# Patient Record
Sex: MALE | Race: WHITE | Employment: FULL TIME | URBAN - METROPOLITAN AREA
[De-identification: names, ages, dates, MRNs, and addresses within clinical notes are randomized per-mention and may not be internally consistent; named-entity substitution may affect disease eponyms.]

---

## 2022-05-19 ENCOUNTER — HOSPITAL ENCOUNTER (INPATIENT)
Age: 32
LOS: 2 days | Discharge: HOME | DRG: 854 | End: 2022-05-21
Attending: EMERGENCY MEDICINE | Admitting: INTERNAL MEDICINE
Payer: COMMERCIAL

## 2022-05-19 ENCOUNTER — APPOINTMENT (OUTPATIENT)
Dept: CT IMAGING | Age: 32
DRG: 854 | End: 2022-05-19
Payer: COMMERCIAL

## 2022-05-19 ENCOUNTER — ANESTHESIA (OUTPATIENT)
Dept: OPERATING ROOM | Age: 32
DRG: 854 | End: 2022-05-19
Payer: COMMERCIAL

## 2022-05-19 ENCOUNTER — ANESTHESIA EVENT (OUTPATIENT)
Dept: OPERATING ROOM | Age: 32
DRG: 854 | End: 2022-05-19
Payer: COMMERCIAL

## 2022-05-19 DIAGNOSIS — A41.9 SEPTICEMIA (HCC): ICD-10-CM

## 2022-05-19 DIAGNOSIS — N49.2 SCROTAL ABSCESS: Primary | ICD-10-CM

## 2022-05-19 LAB
A/G RATIO: 1.2 (ref 1.1–2.2)
ALBUMIN SERPL-MCNC: 4.5 G/DL (ref 3.4–5)
ALP BLD-CCNC: 108 U/L (ref 40–129)
ALT SERPL-CCNC: 16 U/L (ref 10–40)
ANION GAP SERPL CALCULATED.3IONS-SCNC: 14 MMOL/L (ref 3–16)
AST SERPL-CCNC: 17 U/L (ref 15–37)
BASOPHILS ABSOLUTE: 0 K/UL (ref 0–0.2)
BASOPHILS RELATIVE PERCENT: 0 %
BILIRUB SERPL-MCNC: 0.8 MG/DL (ref 0–1)
BILIRUBIN URINE: NEGATIVE
BLOOD, URINE: NEGATIVE
BUN BLDV-MCNC: 8 MG/DL (ref 7–20)
CALCIUM SERPL-MCNC: 9.2 MG/DL (ref 8.3–10.6)
CHLORIDE BLD-SCNC: 98 MMOL/L (ref 99–110)
CLARITY: CLEAR
CO2: 22 MMOL/L (ref 21–32)
COLOR: YELLOW
CREAT SERPL-MCNC: 1 MG/DL (ref 0.9–1.3)
EOSINOPHILS ABSOLUTE: 0 K/UL (ref 0–0.6)
EOSINOPHILS RELATIVE PERCENT: 0 %
GFR AFRICAN AMERICAN: >60
GFR NON-AFRICAN AMERICAN: >60
GLUCOSE BLD-MCNC: 97 MG/DL (ref 70–99)
GLUCOSE URINE: NEGATIVE MG/DL
HCT VFR BLD CALC: 42.1 % (ref 40.5–52.5)
HEMOGLOBIN: 14.5 G/DL (ref 13.5–17.5)
KETONES, URINE: NEGATIVE MG/DL
LACTIC ACID: 1 MMOL/L (ref 0.4–2)
LEUKOCYTE ESTERASE, URINE: NEGATIVE
LYMPHOCYTES ABSOLUTE: 2.1 K/UL (ref 1–5.1)
LYMPHOCYTES RELATIVE PERCENT: 12 %
MCH RBC QN AUTO: 29.7 PG (ref 26–34)
MCHC RBC AUTO-ENTMCNC: 34.4 G/DL (ref 31–36)
MCV RBC AUTO: 86.3 FL (ref 80–100)
MICROSCOPIC EXAMINATION: NORMAL
MONOCYTES ABSOLUTE: 1.1 K/UL (ref 0–1.3)
MONOCYTES RELATIVE PERCENT: 6 %
NEUTROPHILS ABSOLUTE: 14.4 K/UL (ref 1.7–7.7)
NEUTROPHILS RELATIVE PERCENT: 82 %
NITRITE, URINE: NEGATIVE
PDW BLD-RTO: 13.2 % (ref 12.4–15.4)
PH UA: 7 (ref 5–8)
PLATELET # BLD: 225 K/UL (ref 135–450)
PLATELET SLIDE REVIEW: ADEQUATE
PMV BLD AUTO: 7.8 FL (ref 5–10.5)
POTASSIUM SERPL-SCNC: 4.1 MMOL/L (ref 3.5–5.1)
PROTEIN UA: NEGATIVE MG/DL
RBC # BLD: 4.88 M/UL (ref 4.2–5.9)
RBC # BLD: NORMAL 10*6/UL
SLIDE REVIEW: ABNORMAL
SODIUM BLD-SCNC: 134 MMOL/L (ref 136–145)
SPECIFIC GRAVITY UA: 1.01 (ref 1–1.03)
TOTAL PROTEIN: 8.2 G/DL (ref 6.4–8.2)
URINE REFLEX TO CULTURE: NORMAL
URINE TYPE: NORMAL
UROBILINOGEN, URINE: 0.2 E.U./DL
WBC # BLD: 17.6 K/UL (ref 4–11)

## 2022-05-19 PROCEDURE — 2709999900 HC NON-CHARGEABLE SUPPLY: Performed by: UROLOGY

## 2022-05-19 PROCEDURE — 87116 MYCOBACTERIA CULTURE: CPT

## 2022-05-19 PROCEDURE — A4217 STERILE WATER/SALINE, 500 ML: HCPCS | Performed by: UROLOGY

## 2022-05-19 PROCEDURE — 6360000002 HC RX W HCPCS: Performed by: ANESTHESIOLOGY

## 2022-05-19 PROCEDURE — 81003 URINALYSIS AUTO W/O SCOPE: CPT

## 2022-05-19 PROCEDURE — 3700000000 HC ANESTHESIA ATTENDED CARE: Performed by: UROLOGY

## 2022-05-19 PROCEDURE — 1200000000 HC SEMI PRIVATE

## 2022-05-19 PROCEDURE — 3700000001 HC ADD 15 MINUTES (ANESTHESIA): Performed by: UROLOGY

## 2022-05-19 PROCEDURE — 96375 TX/PRO/DX INJ NEW DRUG ADDON: CPT

## 2022-05-19 PROCEDURE — 36415 COLL VENOUS BLD VENIPUNCTURE: CPT

## 2022-05-19 PROCEDURE — 6370000000 HC RX 637 (ALT 250 FOR IP): Performed by: PHYSICIAN ASSISTANT

## 2022-05-19 PROCEDURE — 2500000003 HC RX 250 WO HCPCS: Performed by: PHYSICIAN ASSISTANT

## 2022-05-19 PROCEDURE — 3600000012 HC SURGERY LEVEL 2 ADDTL 15MIN: Performed by: UROLOGY

## 2022-05-19 PROCEDURE — 80053 COMPREHEN METABOLIC PANEL: CPT

## 2022-05-19 PROCEDURE — 6370000000 HC RX 637 (ALT 250 FOR IP): Performed by: UROLOGY

## 2022-05-19 PROCEDURE — 2580000003 HC RX 258: Performed by: UROLOGY

## 2022-05-19 PROCEDURE — 99285 EMERGENCY DEPT VISIT HI MDM: CPT

## 2022-05-19 PROCEDURE — 6360000002 HC RX W HCPCS: Performed by: PHYSICIAN ASSISTANT

## 2022-05-19 PROCEDURE — 6360000002 HC RX W HCPCS: Performed by: UROLOGY

## 2022-05-19 PROCEDURE — 0V950ZZ DRAINAGE OF SCROTUM, OPEN APPROACH: ICD-10-PCS | Performed by: UROLOGY

## 2022-05-19 PROCEDURE — 87075 CULTR BACTERIA EXCEPT BLOOD: CPT

## 2022-05-19 PROCEDURE — 87102 FUNGUS ISOLATION CULTURE: CPT

## 2022-05-19 PROCEDURE — 83605 ASSAY OF LACTIC ACID: CPT

## 2022-05-19 PROCEDURE — 6360000002 HC RX W HCPCS: Performed by: INTERNAL MEDICINE

## 2022-05-19 PROCEDURE — 6360000004 HC RX CONTRAST MEDICATION: Performed by: PHYSICIAN ASSISTANT

## 2022-05-19 PROCEDURE — 3600000002 HC SURGERY LEVEL 2 BASE: Performed by: UROLOGY

## 2022-05-19 PROCEDURE — 87040 BLOOD CULTURE FOR BACTERIA: CPT

## 2022-05-19 PROCEDURE — 7100000000 HC PACU RECOVERY - FIRST 15 MIN: Performed by: UROLOGY

## 2022-05-19 PROCEDURE — 72193 CT PELVIS W/DYE: CPT

## 2022-05-19 PROCEDURE — 96365 THER/PROPH/DIAG IV INF INIT: CPT

## 2022-05-19 PROCEDURE — 87070 CULTURE OTHR SPECIMN AEROBIC: CPT

## 2022-05-19 PROCEDURE — 7100000001 HC PACU RECOVERY - ADDTL 15 MIN: Performed by: UROLOGY

## 2022-05-19 PROCEDURE — 2580000003 HC RX 258: Performed by: ANESTHESIOLOGY

## 2022-05-19 PROCEDURE — 87015 SPECIMEN INFECT AGNT CONCNTJ: CPT

## 2022-05-19 PROCEDURE — 2580000003 HC RX 258: Performed by: PHYSICIAN ASSISTANT

## 2022-05-19 PROCEDURE — 87206 SMEAR FLUORESCENT/ACID STAI: CPT

## 2022-05-19 PROCEDURE — 85025 COMPLETE CBC W/AUTO DIFF WBC: CPT

## 2022-05-19 PROCEDURE — 87205 SMEAR GRAM STAIN: CPT

## 2022-05-19 RX ORDER — ACETAMINOPHEN 500 MG
1000 TABLET ORAL ONCE
Status: COMPLETED | OUTPATIENT
Start: 2022-05-19 | End: 2022-05-19

## 2022-05-19 RX ORDER — 0.9 % SODIUM CHLORIDE 0.9 %
1000 INTRAVENOUS SOLUTION INTRAVENOUS ONCE
Status: COMPLETED | OUTPATIENT
Start: 2022-05-19 | End: 2022-05-19

## 2022-05-19 RX ORDER — SULFAMETHOXAZOLE AND TRIMETHOPRIM 800; 160 MG/1; MG/1
1 TABLET ORAL 2 TIMES DAILY
Status: ON HOLD | COMMUNITY
Start: 2022-05-11 | End: 2022-05-21 | Stop reason: HOSPADM

## 2022-05-19 RX ORDER — OXYCODONE HYDROCHLORIDE 5 MG/1
5 TABLET ORAL EVERY 4 HOURS PRN
Status: DISCONTINUED | OUTPATIENT
Start: 2022-05-19 | End: 2022-05-21 | Stop reason: HOSPADM

## 2022-05-19 RX ORDER — ACETAMINOPHEN 325 MG/1
650 TABLET ORAL EVERY 6 HOURS PRN
Status: DISCONTINUED | OUTPATIENT
Start: 2022-05-19 | End: 2022-05-21 | Stop reason: HOSPADM

## 2022-05-19 RX ORDER — MEPERIDINE HYDROCHLORIDE 25 MG/ML
12.5 INJECTION INTRAMUSCULAR; INTRAVENOUS; SUBCUTANEOUS
Status: DISCONTINUED | OUTPATIENT
Start: 2022-05-19 | End: 2022-05-19 | Stop reason: HOSPADM

## 2022-05-19 RX ORDER — ONDANSETRON 2 MG/ML
4 INJECTION INTRAMUSCULAR; INTRAVENOUS EVERY 6 HOURS PRN
Status: DISCONTINUED | OUTPATIENT
Start: 2022-05-19 | End: 2022-05-21 | Stop reason: HOSPADM

## 2022-05-19 RX ORDER — ONDANSETRON 2 MG/ML
INJECTION INTRAMUSCULAR; INTRAVENOUS PRN
Status: DISCONTINUED | OUTPATIENT
Start: 2022-05-19 | End: 2022-05-19 | Stop reason: SDUPTHER

## 2022-05-19 RX ORDER — DEXAMETHASONE SODIUM PHOSPHATE 4 MG/ML
INJECTION, SOLUTION INTRA-ARTICULAR; INTRALESIONAL; INTRAMUSCULAR; INTRAVENOUS; SOFT TISSUE PRN
Status: DISCONTINUED | OUTPATIENT
Start: 2022-05-19 | End: 2022-05-19 | Stop reason: SDUPTHER

## 2022-05-19 RX ORDER — SODIUM CHLORIDE 9 MG/ML
INJECTION, SOLUTION INTRAVENOUS CONTINUOUS PRN
Status: DISCONTINUED | OUTPATIENT
Start: 2022-05-19 | End: 2022-05-19 | Stop reason: SDUPTHER

## 2022-05-19 RX ORDER — FENTANYL CITRATE 50 UG/ML
INJECTION, SOLUTION INTRAMUSCULAR; INTRAVENOUS PRN
Status: DISCONTINUED | OUTPATIENT
Start: 2022-05-19 | End: 2022-05-19 | Stop reason: SDUPTHER

## 2022-05-19 RX ORDER — CLINDAMYCIN PHOSPHATE 600 MG/50ML
600 INJECTION INTRAVENOUS ONCE
Status: COMPLETED | OUTPATIENT
Start: 2022-05-19 | End: 2022-05-19

## 2022-05-19 RX ORDER — ACETAMINOPHEN 650 MG/1
650 SUPPOSITORY RECTAL EVERY 6 HOURS PRN
Status: DISCONTINUED | OUTPATIENT
Start: 2022-05-19 | End: 2022-05-21 | Stop reason: HOSPADM

## 2022-05-19 RX ORDER — ONDANSETRON 2 MG/ML
4 INJECTION INTRAMUSCULAR; INTRAVENOUS ONCE
Status: COMPLETED | OUTPATIENT
Start: 2022-05-19 | End: 2022-05-19

## 2022-05-19 RX ORDER — MORPHINE SULFATE 4 MG/ML
4 INJECTION, SOLUTION INTRAMUSCULAR; INTRAVENOUS ONCE
Status: COMPLETED | OUTPATIENT
Start: 2022-05-19 | End: 2022-05-19

## 2022-05-19 RX ORDER — SODIUM CHLORIDE 0.9 % (FLUSH) 0.9 %
5-40 SYRINGE (ML) INJECTION PRN
Status: DISCONTINUED | OUTPATIENT
Start: 2022-05-19 | End: 2022-05-21 | Stop reason: HOSPADM

## 2022-05-19 RX ORDER — SODIUM CHLORIDE 0.9 % (FLUSH) 0.9 %
5-40 SYRINGE (ML) INJECTION EVERY 12 HOURS SCHEDULED
Status: DISCONTINUED | OUTPATIENT
Start: 2022-05-19 | End: 2022-05-19 | Stop reason: HOSPADM

## 2022-05-19 RX ORDER — FENTANYL CITRATE 50 UG/ML
50 INJECTION, SOLUTION INTRAMUSCULAR; INTRAVENOUS EVERY 5 MIN PRN
Status: DISCONTINUED | OUTPATIENT
Start: 2022-05-19 | End: 2022-05-19 | Stop reason: HOSPADM

## 2022-05-19 RX ORDER — ENOXAPARIN SODIUM 100 MG/ML
30 INJECTION SUBCUTANEOUS 2 TIMES DAILY
Status: DISCONTINUED | OUTPATIENT
Start: 2022-05-19 | End: 2022-05-21 | Stop reason: HOSPADM

## 2022-05-19 RX ORDER — MIDAZOLAM HYDROCHLORIDE 1 MG/ML
INJECTION INTRAMUSCULAR; INTRAVENOUS PRN
Status: DISCONTINUED | OUTPATIENT
Start: 2022-05-19 | End: 2022-05-19 | Stop reason: SDUPTHER

## 2022-05-19 RX ORDER — SODIUM CHLORIDE 9 MG/ML
INJECTION, SOLUTION INTRAVENOUS PRN
Status: DISCONTINUED | OUTPATIENT
Start: 2022-05-19 | End: 2022-05-21 | Stop reason: HOSPADM

## 2022-05-19 RX ORDER — POLYETHYLENE GLYCOL 3350 17 G/17G
17 POWDER, FOR SOLUTION ORAL DAILY PRN
Status: DISCONTINUED | OUTPATIENT
Start: 2022-05-19 | End: 2022-05-21 | Stop reason: HOSPADM

## 2022-05-19 RX ORDER — IBUPROFEN 200 MG
400 TABLET ORAL EVERY 6 HOURS PRN
COMMUNITY

## 2022-05-19 RX ORDER — SODIUM CHLORIDE 0.9 % (FLUSH) 0.9 %
5-40 SYRINGE (ML) INJECTION PRN
Status: DISCONTINUED | OUTPATIENT
Start: 2022-05-19 | End: 2022-05-19 | Stop reason: HOSPADM

## 2022-05-19 RX ORDER — PROPOFOL 10 MG/ML
INJECTION, EMULSION INTRAVENOUS PRN
Status: DISCONTINUED | OUTPATIENT
Start: 2022-05-19 | End: 2022-05-19 | Stop reason: SDUPTHER

## 2022-05-19 RX ORDER — SODIUM CHLORIDE 9 MG/ML
INJECTION, SOLUTION INTRAVENOUS PRN
Status: DISCONTINUED | OUTPATIENT
Start: 2022-05-19 | End: 2022-05-19 | Stop reason: HOSPADM

## 2022-05-19 RX ORDER — FENTANYL CITRATE 50 UG/ML
25 INJECTION, SOLUTION INTRAMUSCULAR; INTRAVENOUS EVERY 5 MIN PRN
Status: DISCONTINUED | OUTPATIENT
Start: 2022-05-19 | End: 2022-05-19 | Stop reason: HOSPADM

## 2022-05-19 RX ORDER — CLINDAMYCIN PHOSPHATE 600 MG/50ML
600 INJECTION INTRAVENOUS EVERY 8 HOURS
Status: DISCONTINUED | OUTPATIENT
Start: 2022-05-19 | End: 2022-05-19

## 2022-05-19 RX ORDER — MORPHINE SULFATE 2 MG/ML
2 INJECTION, SOLUTION INTRAMUSCULAR; INTRAVENOUS EVERY 4 HOURS PRN
Status: DISCONTINUED | OUTPATIENT
Start: 2022-05-19 | End: 2022-05-21 | Stop reason: HOSPADM

## 2022-05-19 RX ORDER — ONDANSETRON 2 MG/ML
4 INJECTION INTRAMUSCULAR; INTRAVENOUS
Status: DISCONTINUED | OUTPATIENT
Start: 2022-05-19 | End: 2022-05-19 | Stop reason: HOSPADM

## 2022-05-19 RX ORDER — ONDANSETRON 4 MG/1
4 TABLET, ORALLY DISINTEGRATING ORAL EVERY 8 HOURS PRN
Status: DISCONTINUED | OUTPATIENT
Start: 2022-05-19 | End: 2022-05-21 | Stop reason: HOSPADM

## 2022-05-19 RX ORDER — SODIUM CHLORIDE 0.9 % (FLUSH) 0.9 %
5-40 SYRINGE (ML) INJECTION EVERY 12 HOURS SCHEDULED
Status: DISCONTINUED | OUTPATIENT
Start: 2022-05-19 | End: 2022-05-21 | Stop reason: HOSPADM

## 2022-05-19 RX ORDER — MAGNESIUM HYDROXIDE 1200 MG/15ML
LIQUID ORAL CONTINUOUS PRN
Status: COMPLETED | OUTPATIENT
Start: 2022-05-19 | End: 2022-05-19

## 2022-05-19 RX ADMIN — PROPOFOL 160 MG: 10 INJECTION, EMULSION INTRAVENOUS at 17:32

## 2022-05-19 RX ADMIN — MORPHINE SULFATE 4 MG: 4 INJECTION, SOLUTION INTRAMUSCULAR; INTRAVENOUS at 12:30

## 2022-05-19 RX ADMIN — SODIUM CHLORIDE 1000 ML: 9 INJECTION, SOLUTION INTRAVENOUS at 15:18

## 2022-05-19 RX ADMIN — FENTANYL CITRATE 50 MCG: 50 INJECTION INTRAMUSCULAR; INTRAVENOUS at 18:12

## 2022-05-19 RX ADMIN — ENOXAPARIN SODIUM 30 MG: 100 INJECTION SUBCUTANEOUS at 21:42

## 2022-05-19 RX ADMIN — DEXAMETHASONE SODIUM PHOSPHATE 8 MG: 4 INJECTION, SOLUTION INTRAMUSCULAR; INTRAVENOUS at 17:37

## 2022-05-19 RX ADMIN — SODIUM CHLORIDE: 9 INJECTION, SOLUTION INTRAVENOUS at 17:29

## 2022-05-19 RX ADMIN — ONDANSETRON 4 MG: 2 INJECTION INTRAMUSCULAR; INTRAVENOUS at 12:30

## 2022-05-19 RX ADMIN — Medication 1250 MG: at 19:52

## 2022-05-19 RX ADMIN — MIDAZOLAM 2 MG: 1 INJECTION INTRAMUSCULAR; INTRAVENOUS at 17:29

## 2022-05-19 RX ADMIN — ACETAMINOPHEN 1000 MG: 500 TABLET ORAL at 12:26

## 2022-05-19 RX ADMIN — IOPAMIDOL 75 ML: 755 INJECTION, SOLUTION INTRAVENOUS at 13:17

## 2022-05-19 RX ADMIN — PIPERACILLIN AND TAZOBACTAM 3375 MG: 3; .375 INJECTION, POWDER, LYOPHILIZED, FOR SOLUTION INTRAVENOUS at 21:32

## 2022-05-19 RX ADMIN — CLINDAMYCIN IN 5 PERCENT DEXTROSE 600 MG: 12 INJECTION, SOLUTION INTRAVENOUS at 15:19

## 2022-05-19 RX ADMIN — FENTANYL CITRATE 100 MCG: 50 INJECTION INTRAMUSCULAR; INTRAVENOUS at 17:32

## 2022-05-19 RX ADMIN — FENTANYL CITRATE 50 MCG: 50 INJECTION INTRAMUSCULAR; INTRAVENOUS at 18:21

## 2022-05-19 RX ADMIN — ONDANSETRON 4 MG: 2 INJECTION INTRAMUSCULAR; INTRAVENOUS at 17:48

## 2022-05-19 RX ADMIN — OXYCODONE 5 MG: 5 TABLET ORAL at 19:45

## 2022-05-19 ASSESSMENT — PAIN - FUNCTIONAL ASSESSMENT
PAIN_FUNCTIONAL_ASSESSMENT: PREVENTS OR INTERFERES SOME ACTIVE ACTIVITIES AND ADLS
PAIN_FUNCTIONAL_ASSESSMENT: ACTIVITIES ARE NOT PREVENTED
PAIN_FUNCTIONAL_ASSESSMENT: 0-10
PAIN_FUNCTIONAL_ASSESSMENT: ACTIVITIES ARE NOT PREVENTED
PAIN_FUNCTIONAL_ASSESSMENT: ACTIVITIES ARE NOT PREVENTED
PAIN_FUNCTIONAL_ASSESSMENT: 0-10
PAIN_FUNCTIONAL_ASSESSMENT: ACTIVITIES ARE NOT PREVENTED
PAIN_FUNCTIONAL_ASSESSMENT: PREVENTS OR INTERFERES SOME ACTIVE ACTIVITIES AND ADLS
PAIN_FUNCTIONAL_ASSESSMENT: ACTIVITIES ARE NOT PREVENTED

## 2022-05-19 ASSESSMENT — PAIN SCALES - GENERAL
PAINLEVEL_OUTOF10: 7
PAINLEVEL_OUTOF10: 2
PAINLEVEL_OUTOF10: 4
PAINLEVEL_OUTOF10: 6
PAINLEVEL_OUTOF10: 7
PAINLEVEL_OUTOF10: 6
PAINLEVEL_OUTOF10: 7
PAINLEVEL_OUTOF10: 1
PAINLEVEL_OUTOF10: 6
PAINLEVEL_OUTOF10: 1

## 2022-05-19 ASSESSMENT — PAIN DESCRIPTION - ONSET
ONSET: ON-GOING

## 2022-05-19 ASSESSMENT — PAIN DESCRIPTION - LOCATION
LOCATION: SCROTUM
LOCATION: GROIN
LOCATION: PERINEUM
LOCATION: SCROTUM
LOCATION: PERINEUM
LOCATION: PERINEUM

## 2022-05-19 ASSESSMENT — PAIN DESCRIPTION - PAIN TYPE
TYPE: SURGICAL PAIN
TYPE: ACUTE PAIN
TYPE: SURGICAL PAIN

## 2022-05-19 ASSESSMENT — PAIN DESCRIPTION - ORIENTATION
ORIENTATION: RIGHT

## 2022-05-19 ASSESSMENT — PAIN DESCRIPTION - DESCRIPTORS
DESCRIPTORS: SHARP
DESCRIPTORS: ACHING;STABBING
DESCRIPTORS: SHARP
DESCRIPTORS: SHARP
DESCRIPTORS: THROBBING
DESCRIPTORS: SHARP
DESCRIPTORS: ACHING
DESCRIPTORS: SHARP
DESCRIPTORS: ACHING;STABBING

## 2022-05-19 ASSESSMENT — ENCOUNTER SYMPTOMS
SORE THROAT: 0
BACK PAIN: 0
COUGH: 0
VOMITING: 0
NAUSEA: 0
EYE PAIN: 0
ABDOMINAL PAIN: 0
SHORTNESS OF BREATH: 0

## 2022-05-19 ASSESSMENT — PAIN DESCRIPTION - FREQUENCY
FREQUENCY: CONTINUOUS
FREQUENCY: INTERMITTENT
FREQUENCY: CONTINUOUS

## 2022-05-19 ASSESSMENT — LIFESTYLE VARIABLES
HOW OFTEN DO YOU HAVE A DRINK CONTAINING ALCOHOL: MONTHLY OR LESS
SMOKING_STATUS: 1

## 2022-05-19 NOTE — LETTER
[address] [facility]  Phone: [phone]    No name on file. May 21, 2022     Patient: Elizabeth Chiu   YOB: 1990   Date of Visit: 5/19/2022       To Whom It May Concern: It is my medical opinion that Elizabeth Chiu may return to work after one week. Stephanie eKlley may work from home during this time and then return to work with restrictions. For the second week the restrictions include anything other than siting to do work. Keep walking to a minimum. The restrictions may change after his follow up appointment with the wound care clinic. If you have any questions or concerns, please don't hesitate to call. Sincerely,        No name on file.

## 2022-05-19 NOTE — ANESTHESIA POSTPROCEDURE EVALUATION
Department of Anesthesiology  Postprocedure Note    Patient: Kelsey Salmeron  MRN: [id number]  YOB: 1990  Date of evaluation: 5/19/2022  Time:  6:11 PM     Procedure Summary     Date: 05/19/22 Room / Location: [address] [place] [address]    Anesthesia Start: 1729 Anesthesia Stop:     Procedure: SCROTAL INCISION AND DRAINAGE - RIGHT (Right Scrotum) Diagnosis: (SCROTAL ABSCESS)    Surgeons: Myra Claude, MD Responsible Provider: Brendon Cuadra MD    Anesthesia Type: general ASA Status: 2 - Emergent          Anesthesia Type: No value filed. Kim Phase I: Kim Score: 8    Kim Phase II:      Last vitals: Reviewed and per EMR flowsheets.        Anesthesia Post Evaluation    Patient location during evaluation: PACU  Patient participation: complete - patient participated  Level of consciousness: awake and alert  Pain score: 4  Airway patency: patent  Nausea & Vomiting: no nausea and no vomiting  Complications: no  Cardiovascular status: blood pressure returned to baseline  Respiratory status: acceptable  Hydration status: euvolemic

## 2022-05-19 NOTE — ANESTHESIA PRE PROCEDURE
[facility] Department of Anesthesiology  Pre-Anesthesia Evaluation/Consultation       Name:  Monique Workman  : 1990  Age:  27 y. o. MRN:  [id number]  Date: 2022           Surgeon: Tierra Franklin):  Skylar Murcia MD    Procedure: Procedure(s):  SCROTAL INCISION AND DRAINAGE - RIGHT     No Known Allergies  There is no problem list on file for this patient. History reviewed. No pertinent past medical history. History reviewed. No pertinent surgical history. Social History     Tobacco Use    Smoking status: Current Every Day Smoker     Packs/day: 0.50    Smokeless tobacco: Never Used   Substance Use Topics    Alcohol use: Not Currently    Drug use: Never     Medications  No current facility-administered medications on file prior to encounter.      Current Outpatient Medications on File Prior to Encounter   Medication Sig Dispense Refill    sulfamethoxazole-trimethoprim (BACTRIM DS;SEPTRA DS) 800-160 MG per tablet Take 1 tablet by mouth 2 times daily      ibuprofen (ADVIL;MOTRIN) 200 MG tablet Take 400 mg by mouth every 6 hours as needed for Pain       Current Facility-Administered Medications   Medication Dose Route Frequency Provider Last Rate Last Admin    clindamycin (CLEOCIN) 600 mg in dextrose 5 % 50 mL IVPB  600 mg IntraVENous Once Nelidae Stephane, PA-C 100 mL/hr at 22 1519 600 mg at 22 1519    0.9 % sodium chloride bolus  1,000 mL IntraVENous Once Brainakathy Swanur, PA-C 999 mL/hr at 22 1518 1,000 mL at 22 1518     Current Outpatient Medications   Medication Sig Dispense Refill    sulfamethoxazole-trimethoprim (BACTRIM DS;SEPTRA DS) 800-160 MG per tablet Take 1 tablet by mouth 2 times daily      ibuprofen (ADVIL;MOTRIN) 200 MG tablet Take 400 mg by mouth every 6 hours as needed for Pain       Vital Signs (Current)   Vitals:    22 1215 22 1240 22 1430 22 1431   BP: (!) 145/93 (!) 140/88 138/86 Pulse:       Resp:    16   Temp:       TempSrc:    Oral   SpO2:  96% 97% 95%   Weight:       Height:                                                Vital Signs Statistics (for past 48 hrs)     Temp  Av °F (36.7 °C)  Min: 98 °F (36.7 °C)   Min taken time: 22 1126  Max: 98 °F (36.7 °C)   Max taken time: 22 1126  Pulse  Av  Min: 95   Min taken time: 22 1126  Max: 95   Max taken time: 22 1126  Resp  Av  Min: 16   Min taken time: 22 1431  Max: 16   Max taken time: 22 1431  BP  Min: 134/80   Min taken time: 22 1126  Max: 150/88   Max taken time: 22 1206  MAP (mmHg)  Av  Min: 101   Min taken time: 22 1430  Max: 109   Max taken time: 22 1215  SpO2  Av %  Min: 95 %   Min taken time: 22 1431  Max: 100 %   Max taken time: 22 1126  BP Readings from Last 3 Encounters:   22 138/86       BMI  Body mass index is 36.79 kg/m². Estimated body mass index is 36.79 kg/m² as calculated from the following:    Height as of this encounter: 5' 9\" (1.753 m). Weight as of this encounter: 249 lb 1.9 oz (113 kg).     CBC   Lab Results   Component Value Date    WBC 17.6 2022    RBC 4.88 2022    HGB 14.5 2022    HCT 42.1 2022    MCV 86.3 2022    RDW 13.2 2022     2022     CMP    Lab Results   Component Value Date     2022    K 4.1 2022    CL 98 2022    CO2 22 2022    BUN 8 2022    CREATININE 1.0 2022    GFRAA >60 2022    AGRATIO 1.2 2022    LABGLOM >60 2022    GLUCOSE 97 2022    PROT 8.2 2022    CALCIUM 9.2 2022    BILITOT 0.8 2022    ALKPHOS 108 2022    AST 17 2022    ALT 16 2022     BMP    Lab Results   Component Value Date     2022    K 4.1 2022    CL 98 2022    CO2 22 2022    BUN 8 2022    CREATININE 1.0 2022    CALCIUM 9.2 2022    GFRAA >60 05/19/2022    LABGLOM >60 05/19/2022    GLUCOSE 97 05/19/2022     POCGlucose  Recent Labs     05/19/22  1220   GLUCOSE 97      Coags  No results found for: PROTIME, INR, APTT  HCG (If Applicable) No results found for: PREGTESTUR, PREGSERUM, HCG, HCGQUANT   ABGs No results found for: PHART, PO2ART, IZW0QYM, JUM6UTJ, BEART, E5HIZUTC   Type & Screen (If Applicable)  No results found for: LABABO, LABRH                         BMI: Wt Readings from Last 3 Encounters:       NPO Status:                          Anesthesia Evaluation  Patient summary reviewed no history of anesthetic complications:   Airway: Mallampati: II  TM distance: >3 FB   Neck ROM: full  Mouth opening: > = 3 FB Dental: normal exam         Pulmonary: breath sounds clear to auscultation  (+) current smoker    (-) COPD, asthma and sleep apnea                           Cardiovascular:  Exercise tolerance: good (>4 METS),       (-) hypertension, past MI, CABG/stent,  angina and no hyperlipidemia        Rate: normal                    Neuro/Psych:      (-) seizures, TIA and CVA           GI/Hepatic/Renal:        (-) GERD and PUD       Endo/Other:        (-) diabetes mellitus, hypothyroidism               Abdominal:             Vascular:     - DVT and PE. Other Findings:           Anesthesia Plan      general     ASA 2 - emergent       Induction: intravenous. MIPS: Postoperative opioids intended and Prophylactic antiemetics administered. Anesthetic plan and risks discussed with patient. Plan discussed with CRNA. This pre-anesthesia assessment may be used as a history and physical.    DOS STAFF ADDENDUM:    Pt seen and examined, chart reviewed (including anesthesia, drug and allergy history). No interval changes to history and physical examination. Anesthetic plan, risks, benefits, alternatives, and personnel involved discussed with patient. Patient verbalized an understanding and agrees to proceed.       Adela Rivero MD  May 19, 2022  3:41 PM

## 2022-05-19 NOTE — ED PROVIDER NOTES
[address] [facility]        Pt Name: Nataliya Guerrero  MRN: [id number]  [place] 1990  Date of evaluation: 5/19/2022  Provider: Joy Grant PA-C  PCP: No primary care provider on file. Note Started: 2:45 PM EDT       I have seen and evaluated this patient with my supervising physician Michael Hughes DO. Triage CHIEF COMPLAINT       Chief Complaint   Patient presents with    Abscess     Has a cyst or abscess on his groin and states very painful and he has had some fevers, no fever today         HISTORY OF PRESENT ILLNESS   (Location/Symptom, Timing/Onset, Context/Setting, Quality, Duration, Modifying Factors, Severity)  Note limiting factors. Chief Complaint: Scrotal abscess. Patient states he has had a cyst in the scrotum since a kid. Swollen off and on. And recently swollen and started draining. He said has been going on for the last couple weeks. He did call general surgery group and Jonathon which she is supposed to have an appointment tomorrow but because he had a fever last night he came in today complaining still pain in the the general surgery he talked to did call him in [facility]. He has been on that for 9 days have 1 more day of antibiotics left. He denies lightheaded or dizziness. No abdominal pain. Denies hematuria. No penile discharge. Says the drainage from the scrotum is a foul order. No other complaints. Nataliya Guerrero is a 27 y.o. male who presents to the emergency room with the above complaint. Nursing Notes were all reviewed and agreed with or any disagreements were addressed in the HPI. REVIEW OF SYSTEMS    (2-9 systems for level 4, 10 or more for level 5)     Review of Systems   Constitutional: Negative for chills and fever. HENT: Negative for congestion and sore throat. Eyes: Negative for pain and visual disturbance. Respiratory: Negative for cough and shortness of breath. Cardiovascular: Negative for chest pain and leg swelling. Gastrointestinal: Negative for abdominal pain, nausea and vomiting. Genitourinary: Positive for scrotal swelling. Negative for dysuria, frequency, penile pain, penile swelling and testicular pain. Musculoskeletal: Negative for back pain and neck pain. Skin: Negative for rash and wound. Neurological: Negative for dizziness and light-headedness. PAST MEDICAL HISTORY   History reviewed. No pertinent past medical history. SURGICAL HISTORY   History reviewed. No pertinent surgical history. CURRENTMEDICATIONS       Previous Medications    IBUPROFEN (ADVIL;MOTRIN) 200 MG TABLET    Take 400 mg by mouth every 6 hours as needed for Pain    SULFAMETHOXAZOLE-TRIMETHOPRIM (BACTRIM DS;SEPTRA DS) 800-160 MG PER TABLET    Take 1 tablet by mouth 2 times daily       ALLERGIES     Patient has no known allergies. FAMILYHISTORY     History reviewed. No pertinent family history. SOCIAL HISTORY       Social History     Socioeconomic History    Marital status: Single     Spouse name: None    Number of children: None    Years of education: None    Highest education level: None   Occupational History    None   Tobacco Use    Smoking status: Current Every Day Smoker     Packs/day: 0.50    Smokeless tobacco: Never Used   Substance and Sexual Activity    Alcohol use: Not Currently    Drug use: Never    Sexual activity: None   Other Topics Concern    None   Social History Narrative    None     Social Determinants of Health     Financial Resource Strain:     Difficulty of Paying Living Expenses: Not on file   Food Insecurity:     Worried About Running Out of Food in the Last Year: Not on file    Yessy of Food in the Last Year: Not on file   Transportation Needs:     Lack of Transportation (Medical): Not on file    Lack of Transportation (Non-Medical):  Not on file   Physical Activity:     Days of Exercise per Week: Not on file    Minutes of Exercise per Session: Not on file   Stress:     Feeling of Stress : Not on file   Social Connections:     Frequency of Communication with Friends and Family: Not on file    Frequency of Social Gatherings with Friends and Family: Not on file    Attends Voodoo Services: Not on file    Active Member of [address] [place] [address] or Organizations: Not on file    Attends Club or Organization Meetings: Not on file    Marital Status: Not on file   Intimate Partner Violence:     Fear of Current or Ex-Partner: Not on file    Emotionally Abused: Not on file    Physically Abused: Not on file    Sexually Abused: Not on file   Housing Stability:     Unable to Pay for Housing in the Last Year: Not on file    Number of [place] in the Last Year: Not on file    Unstable Housing in the Last Year: Not on file       SCREENINGS    [place] Coma Scale  Eye Opening: Spontaneous  Best Verbal Response: Oriented  Best Motor Response: Obeys commands  [place] Coma Scale Score: 15        PHYSICAL EXAM    (up to 7 for level 4, 8 or more for level 5)     ED Triage Vitals   BP Temp Temp Source Pulse Resp SpO2 Height Weight   05/19/22 1126 05/19/22 1126 05/19/22 1431 05/19/22 1126 05/19/22 1126 05/19/22 1126 05/19/22 1126 05/19/22 1126   134/80 98 °F (36.7 °C) Oral 95 16 100 % 5' 9\" (1.753 m) 249 lb 1.9 oz (113 kg)       Physical Exam  Vitals and nursing note reviewed. Exam conducted with a chaperone present. Constitutional:       Appearance: He is well-developed. He is not diaphoretic. HENT:      Head: Normocephalic and atraumatic. Nose: Nose normal.      Mouth/Throat:      Mouth: Mucous membranes are moist.      Pharynx: Oropharynx is clear. No oropharyngeal exudate or posterior oropharyngeal erythema. Eyes:      General:         Right eye: No discharge. Left eye: No discharge. Cardiovascular:      Rate and Rhythm: Normal rate and regular rhythm. Heart sounds: Normal heart sounds. No murmur heard. No friction rub.  No gallop. Pulmonary:      Effort: Pulmonary effort is normal. No respiratory distress. Breath sounds: Normal breath sounds. No wheezing or rales. Chest:      Chest wall: No tenderness. Abdominal:      General: Abdomen is flat. Bowel sounds are normal. There is no distension. Palpations: Abdomen is soft. There is no mass. Tenderness: There is no abdominal tenderness. There is no guarding or rebound. Genitourinary:     Penis: Normal. No erythema, tenderness or discharge. Testes:         Right: Tenderness and swelling present. Left: Tenderness and swelling present. Epididymis:      Right: Normal.      Left: Normal.   Musculoskeletal:         General: Normal range of motion. Cervical back: Normal range of motion and neck supple. Lymphadenopathy:      Lower Body: Right inguinal adenopathy present. No left inguinal adenopathy. Skin:     General: Skin is warm and dry. Neurological:      General: No focal deficit present. Mental Status: He is alert and oriented to person, place, and time. Psychiatric:         Behavior: Behavior normal.         DIAGNOSTIC RESULTS   LABS:    Labs Reviewed   CBC WITH AUTO DIFFERENTIAL - Abnormal; Notable for the following components:       Result Value    WBC 17.6 (*)     Neutrophils Absolute 14.4 (*)     All other components within normal limits   COMPREHENSIVE METABOLIC PANEL - Abnormal; Notable for the following components:    Sodium 134 (*)     Chloride 98 (*)     All other components within normal limits   CULTURE, WOUND   CULTURE, BLOOD 1   CULTURE, BLOOD 2   URINALYSIS WITH REFLEX TO CULTURE   LACTIC ACID       When ordered, only abnormal lab results are displayed. All other labs were within normal range or not returned as of this dictation. EKG: When ordered, EKG's are interpreted by the Emergency Department Physician in the absence of a cardiologist.  Please see their note for interpretation of EKG.       RADIOLOGY: Non-plain film images such as CT, Ultrasound and MRI are read by the radiologist. Plain radiographic images are visualized andpreliminarily interpreted by the  ED Provider with the below findings:        Interpretation perthe Radiologist below, if available at the time of this note:    CT PELVIS W CONTRAST Additional Contrast? None   Final Result   Complex right scrotal abscess with air content, measuring 9.4 cm. Reactive right inguinal adenopathy. RECOMMENDATIONS:   Unavailable           CT PELVIS W CONTRAST Additional Contrast? None    Result Date: 5/19/2022  EXAMINATION: CT OF THE PELVIS WITH CONTRAST 5/19/2022 1:04 pm TECHNIQUE: CT of the pelvis was performed with the administration of intravenous contrast. Multiplanar reformatted images are provided for review. Automated exposure control, iterative reconstruction, and/or weight based adjustment of the mA/kV was utilized to reduce the radiation dose to as low as reasonably achievable. COMPARISON: None. HISTORY: ORDERING SYSTEM PROVIDED HISTORY: Concern for scrotal abscess TECHNOLOGIST PROVIDED HISTORY: Reason for exam:->Concern for scrotal abscess Additional Contrast?->None FINDINGS: There is a trace right inguinal hernia with fat content. No bowel content hernia is identified. In the right hemiscrotum there is a multi septated cystic structure measuring 7.4 x 6.2 x 9.4 cm. There is a mild to moderate amount of non dependent air within the collection. There are small bilateral scrotal hydroceles. There is moderate surrounding scrotal edema. Right inguinal lymph node is measure up to 1.3 cm in short axis. Urinary bladder and prostate gland are unremarkable. Visualized bowel is unremarkable. There is no retroperitoneal adenopathy. No acute osseous abnormality. Small umbilical hernia with fat content. Complex right scrotal abscess with air content, measuring 9.4 cm. Reactive right inguinal adenopathy.  RECOMMENDATIONS: Unavailable PROCEDURES   Unless otherwise noted below, none     Procedures    CRITICAL CARE TIME   N/A    CONSULTS:  IP CONSULT TO UROLOGY      EMERGENCY DEPARTMENT COURSE and DIFFERENTIAL DIAGNOSIS/MDM:   Vitals:    Vitals:    05/19/22 1215 05/19/22 1240 05/19/22 1430 05/19/22 1431   BP: (!) 145/93 (!) 140/88 138/86    Pulse:       Resp:    16   Temp:       TempSrc:    Oral   SpO2:  96% 97% 95%   Weight:       Height:           Patient was given thefollowing medications:  Medications   clindamycin (CLEOCIN) 600 mg in dextrose 5 % 50 mL IVPB (600 mg IntraVENous New Bag 5/19/22 1519)   0.9 % sodium chloride bolus (1,000 mLs IntraVENous New Bag 5/19/22 1518)   ondansetron (ZOFRAN) injection 4 mg (4 mg IntraVENous Given 5/19/22 1230)   morphine (PF) injection 4 mg (4 mg IntraVENous Given 5/19/22 1230)   acetaminophen (TYLENOL) tablet 1,000 mg (1,000 mg Oral Given 5/19/22 1226)   iopamidol (ISOVUE-370) 76 % injection 75 mL (75 mLs IntraVENous Given 5/19/22 1317)         Is this patient to be included in the SEP-1 Core Measure due to severe sepsis or septic shock? No   Exclusion criteria - the patient is NOT to be included for SEP-1 Core Measure due to:  May have criteria for sepsis, but does not meet criteria for severe sepsis or septic shock    Emergency room course: Patient on exam throat is clear. Nonerythematous no exudate. Cardiovascular regular rhythm, lungs are clear. No wheeze rales or rhonchi noted. Abdomen is soft nontender. Normal bowel sounds all 4 quadrant. Pelvic area shows right inguinal adenopathy with palpation. Right-sided scrotal swelling extending over to the left with redness noted more in the right. There is some greenish foul odor discharge or drainage noted. Wound culture was obtained. Penis show no rash no swelling no redness. No scrotal was tender with palpation. Lab result from today shows:  Lactic acid 1.0.     Urinalysis negative leukocyte negative nitrites, negative blood, negative for ketones. CBC White count 17.6. All other values within normal limits. CMP unremarkable. Blood cultures are pending. CT scan shows complex right scrotal abscess with air content measuring 9.4 cm. At this point I did consult with my attending to see this patient with me Dr. Adriana Vick. He wanted me to add a lactic acid as well as blood cultures. The patient on clindamycin and give a liter normal saline. Talk to Kira Pabon who is on-call for urology. And she states go ahead and admit this patient with the hospitalist service and she will come down to see the patient. I will place a call out to hospitalist service to [facility] for admission. Patient was okay with this plan as well. He will be admitted in stable condition. I was informed by the nurse that the urologist did come down and she is going to take him to surgery from the ED. Could be within the hour. FINAL IMPRESSION      1. Scrotal abscess    2. Septicemia [facility])          DISPOSITION/PLAN   DISPOSITION Decision To Admit 05/19/2022 03:26:39 PM      PATIENT REFERREDTO:  No follow-up provider specified.     DISCHARGE MEDICATIONS:  New Prescriptions    No medications on file       DISCONTINUED MEDICATIONS:  Discontinued Medications    No medications on file          (Please note that portions ofthis note were completed with a voice recognition program.  Efforts were made to edit the dictations but occasionally words are mis-transcribed.)    Ciera Mccann PA-C (electronically signed)             Ciera Mccann PA-C  05/19/22 8624

## 2022-05-19 NOTE — BRIEF OP NOTE
Brief Postoperative Note      Patient: Kushal Burt  YOB: 1990  MRN: [id number]    Date of Procedure: 5/19/2022    Pre-Op Diagnosis: SCROTAL ABSCESS    Post-Op Diagnosis: large right scrotal abscess       Procedure(s):  SCROTAL INCISION AND DRAINAGE - RIGHT    Surgeon(s):  Da Lugo MD    Assistant:  Surgical Assistant: Sanjiv López RN    Anesthesia: General    Estimated Blood Loss (mL): less than 50     Complications: None    Specimens:   ID Type Source Tests Collected by Time Destination   1 : 1   SCROTAL ABSCESS CULTURE SWAB Body Fluid Fluid CULTURE, FUNGUS, CULTURE, BODY FLUID, CULTURE WITH SMEAR, ACID FAST BACILLIUS, CULTURE, ANAEROBIC AND AEROBIC Da Lugo MD 5/19/2022 1744        Implants:  * No implants in log *      Drains: * No LDAs found *    Findings: large loculated abscess right hemiscrotum    Electronically signed by Da Lugo MD on 5/19/2022 at 6:05 PM

## 2022-05-19 NOTE — ED PROVIDER NOTES
[facility]      Pt Name: Antoni Boswell  MRN: [id number]  [place] 1990  Date of evaluation: 5/19/2022  Provider: Dottie Katz, [address] [place] [address]  Chief Complaint   Patient presents with    Abscess     Has a cyst or abscess on his groin and states very painful and he has had some fevers, no fever today       I have fully participated in the care of Antoni Boswell and have had a face-to-face evaluation. I have reviewed and agree with all pertinent clinical information, and midlevel provider's history, and physical exam. I have also reviewed the labs and imaging studies and treatment plan. I have also reviewed and agree with the medications, allergies and past medical history section for this Antoni Boswell. I agree with the diagnosis, and I concur. I wore personal protective equipment when I was in the room the entire time. This includes gloves, N95 mask, face shield, and a glove over my stethoscope for protection. History reviewed. No pertinent past medical history. MDM:  Antoni Boswell is a 27 y.o. male who presents with pain redness of his scrotum. He has been running fever. He denies any dysuria nocturia hematuria. He states has had a cyst there since he was a teenager. However, over the past week and a half is gotten erythematous swollen and painful. Physical exam reveals swelling erythema and induration of the entire scrotum with the right being worse than the left. There are pustules noted over the scrotum. Erythema extends into the inguinal area on the right. Laboratory work reveals a white count of 17,000. Heart rate is 95. CT scan reveals abscess of the scrotum along with gas in the scrotum. 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 Therefore, patient was admitted to the hospital.  Dr. Bry Wang was notified and she is come down to see the patient.   Patient will be admitted to the hospitalist.    Anna Warsaw:    05/19/22 1431   BP:    Pulse:    Resp: 16   Temp:    SpO2: 95%       Lab results  Labs Reviewed   CBC WITH AUTO DIFFERENTIAL - Abnormal; Notable for the following components:       Result Value    WBC 17.6 (*)     Neutrophils Absolute 14.4 (*)     All other components within normal limits   COMPREHENSIVE METABOLIC PANEL - Abnormal; Notable for the following components:    Sodium 134 (*)     Chloride 98 (*)     All other components within normal limits   CULTURE, WOUND   CULTURE, BLOOD 1   CULTURE, BLOOD 2   URINALYSIS WITH REFLEX TO CULTURE   LACTIC ACID       Radiology results  CT PELVIS W CONTRAST Additional Contrast? None   Final Result   Complex right scrotal abscess with air content, measuring 9.4 cm. Reactive right inguinal adenopathy. RECOMMENDATIONS:   Unavailable               Medications   clindamycin (CLEOCIN) 600 mg in dextrose 5 % 50 mL IVPB (has no administration in time range)   0.9 % sodium chloride bolus (has no administration in time range)   ondansetron (ZOFRAN) injection 4 mg (4 mg IntraVENous Given 5/19/22 1230)   morphine (PF) injection 4 mg (4 mg IntraVENous Given 5/19/22 1230)   acetaminophen (TYLENOL) tablet 1,000 mg (1,000 mg Oral Given 5/19/22 1226)   iopamidol (ISOVUE-370) 76 % injection 75 mL (75 mLs IntraVENous Given 5/19/22 1317)       New Prescriptions    No medications on file       The patient's blood pressure was found to be elevated according to CMS/Medicare and the Affordable Care Act/ObamaCare criteria. Elevated blood pressure could occur because of pain or anxiety or other reasons and does not mean that they need to have their blood pressure treated or medications otherwise adjusted. However, this could also be a sign that they will need to have their blood pressure treated or medications changed. The patient was instructed to follow up closely with their personal physician to have their blood pressure rechecked.  The patient was instructed to take a list of recent blood pressure readings to their next visit with their personal physician. IMPRESSIONS:  1. Scrotal abscess    2.  Septicemia [facility])               Therese Lim DO  05/19/22 1757

## 2022-05-19 NOTE — CONSULTS
Consulting Provider:  Roberto Kim    Reason for Consult: scrotal abscess    History of Present Illness: Zee Doan is a 27 y.o. male who reports some sort of scrotal cyst for several years which has been growing in size for the past few weeks and he felt it \"pop\" while playing volleyball. Over the past few days it has become red and swollen with foul smelling drainage. He has had low grade fevers    Past Medical History:   History reviewed. No pertinent past medical history. Past Surgical History:  History reviewed. No pertinent surgical history. Social History:  Social History     Socioeconomic History    Marital status: Single     Spouse name: Not on file    Number of children: Not on file    Years of education: Not on file    Highest education level: Not on file   Occupational History    Not on file   Tobacco Use    Smoking status: Current Every Day Smoker     Packs/day: 0.50    Smokeless tobacco: Never Used   Substance and Sexual Activity    Alcohol use: Not Currently    Drug use: Never    Sexual activity: Not on file   Other Topics Concern    Not on file   Social History Narrative    Not on file     Social Determinants of Health     Financial Resource Strain:     Difficulty of Paying Living Expenses: Not on file   Food Insecurity:     Worried About Running Out of Food in the Last Year: Not on file    Yessy of Food in the Last Year: Not on file   Transportation Needs:     Lack of Transportation (Medical): Not on file    Lack of Transportation (Non-Medical):  Not on file   Physical Activity:     Days of Exercise per Week: Not on file    Minutes of Exercise per Session: Not on file   Stress:     Feeling of Stress : Not on file   Social Connections:     Frequency of Communication with Friends and Family: Not on file    Frequency of Social Gatherings with Friends and Family: Not on file    Attends [employer] Services: Not on file   [employer] of [employer]s or Organizations: Not on file    Attends Club or Organization Meetings: Not on file    Marital Status: Not on file   Intimate Partner Violence:     Fear of Current or Ex-Partner: Not on file    Emotionally Abused: Not on file    Physically Abused: Not on file    Sexually Abused: Not on file   Housing Stability:     Unable to Pay for Housing in the Last Year: Not on file    Number of Cecelia in the Last Year: Not on file    Unstable Housing in the Last Year: Not on file       Family History:  History reviewed. No pertinent family history.     Meds:   Current Facility-Administered Medications: clindamycin (CLEOCIN) 600 mg in dextrose 5 % 50 mL IVPB, 600 mg, IntraVENous, Once  0.9 % sodium chloride bolus, 1,000 mL, IntraVENous, Once    Review of Systems:  10 Systems were reviewed and negative except as in HPI    Vitals:  /86   Pulse 95   Temp 98 °F (36.7 °C)   Resp 16   Ht 5' 9\" (1.753 m)   Wt 249 lb 1.9 oz (113 kg)   SpO2 95%   BMI 36.79 kg/m²   No intake or output data in the 24 hours ending 05/19/22 1519    Physical Exam:  General Appearance: Alert and oriented, cooperative, no distress, appears stated age  Head: Normocephalic, without obvious abnormality, atraumatic  Back: no CVA tenderness  Lungs: respirations unlabored  Heart: Regular rate and rhythm, no lower extremity edema noted  Abdomen: Soft, non-tender, non-distended, no masses  Skin: Skin color, texture, turgor normal, no rashes or lesions  Neurologic: no gross deficits   Male :   Nonpalpable bladder   No CVA tenderness   Penis appears normal and  circumcised   Urethral meatus is normal in size and location   Scrotum - Right hemiscrotum erythematous and indurated with foul smelling drainage from proximal aspect, unable to palpate testicle, skin erythema extending to groin and inner thifh left testicle normal     CECI Not indicated    Labs:  CBC   Lab Results   Component Value Date    WBC 17.6 05/19/2022    RBC 4.88 05/19/2022 HGB 14.5 05/19/2022    HCT 42.1 05/19/2022    MCV 86.3 05/19/2022    MCH 29.7 05/19/2022    MCHC 34.4 05/19/2022    RDW 13.2 05/19/2022     05/19/2022    MPV 7.8 05/19/2022     BMP   Lab Results   Component Value Date     05/19/2022    K 4.1 05/19/2022    CL 98 05/19/2022    CO2 22 05/19/2022    BUN 8 05/19/2022    CREATININE 1.0 05/19/2022    GLUCOSE 97 05/19/2022    CALCIUM 9.2 05/19/2022       Urinalysis:   Lab Results   Component Value Date    COLORU Yellow 05/19/2022    GLUCOSEU Negative 05/19/2022    BLOODU Negative 05/19/2022    NITRU Negative 05/19/2022    LEUKOCYTESUR Negative 05/19/2022       Imaging:   Complex right scrotal abscess with air content, measuring 9.4 cm.       Reactive right inguinal adenopathy. Impression/Plan:  Large right scrotal abscess    Urgent I and D of scrotal abscess.   Risks including but not limited to bleeding, infection, need for further procedures, possible need for orchiectomy discussed    Moshe Ley MD 5/19/20223:19 PM

## 2022-05-19 NOTE — PROGRESS NOTES
1805 pt arrived from OR, vitals stable on 4L NC. Dressing clean, dry, and intact- abd pad, packed with roll gauze, mesh panties.

## 2022-05-19 NOTE — CONSULTS
Clinical Pharmacy Note  Vancomycin Consult    Kate Dimas is a 27 y.o. male ordered Vancomycin for scrotal abscess; consult received from Dr. Lucio Neville to manage therapy. Also receiving Zosyn. Patient Active Problem List   Diagnosis    Scrotal abscess       Allergies:  Patient has no known allergies. Temp max:  Temp (24hrs), Av.3 °F (36.8 °C), Min:98 °F (36.7 °C), Max:98.7 °F (37.1 °C)      Recent Labs     22  1220   WBC 17.6*       Recent Labs     22  1220   BUN 8   CREATININE 1.0       No intake or output data in the 24 hours ending 22 1940      Ht Readings from Last 1 Encounters:   22 5' 9\" (1.753 m)        Wt Readings from Last 1 Encounters:   22 249 lb 1.9 oz (113 kg)         Estimated Creatinine Clearance: 129 mL/min (based on SCr of 1 mg/dL). Assessment/Plan:  Vancomycin 1250 mg IV every 12 hours ordered. Regimen projects a trough level of 10-15 mg/L. Level ordered for 22 1900. Thank you for the consult.    Philomena Wise [facility],2022,7:41 PM

## 2022-05-19 NOTE — PROGRESS NOTES
Medication Reconciliation    List of medications patient is currently taking is complete. Source of information: 1. Conversation with patient at bedside                                      2. EPIC records      Allergies  Patient has no known allergies. Notes regarding home medications:   1. Patient received all of his home medications prior to arrival to the emergency department. 2. Patient was prescribed Bactrim DS 1 BID on 05/11/22 for 10 days. Took am dose today.

## 2022-05-20 LAB
ANION GAP SERPL CALCULATED.3IONS-SCNC: 10 MMOL/L (ref 3–16)
BASOPHILS ABSOLUTE: 0 K/UL (ref 0–0.2)
BASOPHILS RELATIVE PERCENT: 0.2 %
BUN BLDV-MCNC: 9 MG/DL (ref 7–20)
C-REACTIVE PROTEIN: 155 MG/L (ref 0–5.1)
CALCIUM SERPL-MCNC: 9.2 MG/DL (ref 8.3–10.6)
CHLORIDE BLD-SCNC: 100 MMOL/L (ref 99–110)
CO2: 23 MMOL/L (ref 21–32)
CREAT SERPL-MCNC: 1.1 MG/DL (ref 0.9–1.3)
EOSINOPHILS ABSOLUTE: 0 K/UL (ref 0–0.6)
EOSINOPHILS RELATIVE PERCENT: 0 %
GFR AFRICAN AMERICAN: >60
GFR NON-AFRICAN AMERICAN: >60
GLUCOSE BLD-MCNC: 155 MG/DL (ref 70–99)
HAV IGM SER IA-ACNC: NORMAL
HCT VFR BLD CALC: 39.6 % (ref 40.5–52.5)
HEMOGLOBIN: 13.6 G/DL (ref 13.5–17.5)
HEPATITIS B CORE IGM ANTIBODY: NORMAL
HEPATITIS B SURFACE ANTIGEN INTERPRETATION: NORMAL
HEPATITIS C ANTIBODY INTERPRETATION: NORMAL
HIV AG/AB: NORMAL
HIV ANTIGEN: NORMAL
HIV-1 ANTIBODY: NORMAL
HIV-2 AB: NORMAL
LYMPHOCYTES ABSOLUTE: 1 K/UL (ref 1–5.1)
LYMPHOCYTES RELATIVE PERCENT: 6.3 %
MCH RBC QN AUTO: 29.6 PG (ref 26–34)
MCHC RBC AUTO-ENTMCNC: 34.2 G/DL (ref 31–36)
MCV RBC AUTO: 86.6 FL (ref 80–100)
MONOCYTES ABSOLUTE: 0.6 K/UL (ref 0–1.3)
MONOCYTES RELATIVE PERCENT: 3.7 %
NEUTROPHILS ABSOLUTE: 13.9 K/UL (ref 1.7–7.7)
NEUTROPHILS RELATIVE PERCENT: 89.8 %
PDW BLD-RTO: 13.1 % (ref 12.4–15.4)
PLATELET # BLD: 223 K/UL (ref 135–450)
PMV BLD AUTO: 8.3 FL (ref 5–10.5)
POTASSIUM REFLEX MAGNESIUM: 4.7 MMOL/L (ref 3.5–5.1)
RBC # BLD: 4.57 M/UL (ref 4.2–5.9)
SEDIMENTATION RATE, ERYTHROCYTE: 95 MM/HR (ref 0–15)
SODIUM BLD-SCNC: 133 MMOL/L (ref 136–145)
TOTAL SYPHILLIS IGG/IGM: NORMAL
WBC # BLD: 15.5 K/UL (ref 4–11)

## 2022-05-20 PROCEDURE — 1200000000 HC SEMI PRIVATE

## 2022-05-20 PROCEDURE — 36415 COLL VENOUS BLD VENIPUNCTURE: CPT

## 2022-05-20 PROCEDURE — 86780 TREPONEMA PALLIDUM: CPT

## 2022-05-20 PROCEDURE — 2580000003 HC RX 258: Performed by: INTERNAL MEDICINE

## 2022-05-20 PROCEDURE — 86140 C-REACTIVE PROTEIN: CPT

## 2022-05-20 PROCEDURE — 6360000002 HC RX W HCPCS: Performed by: UROLOGY

## 2022-05-20 PROCEDURE — 6370000000 HC RX 637 (ALT 250 FOR IP): Performed by: UROLOGY

## 2022-05-20 PROCEDURE — 6360000002 HC RX W HCPCS: Performed by: INTERNAL MEDICINE

## 2022-05-20 PROCEDURE — 86702 HIV-2 ANTIBODY: CPT

## 2022-05-20 PROCEDURE — 80048 BASIC METABOLIC PNL TOTAL CA: CPT

## 2022-05-20 PROCEDURE — 87491 CHLMYD TRACH DNA AMP PROBE: CPT

## 2022-05-20 PROCEDURE — 99223 1ST HOSP IP/OBS HIGH 75: CPT | Performed by: INTERNAL MEDICINE

## 2022-05-20 PROCEDURE — 87591 N.GONORRHOEAE DNA AMP PROB: CPT

## 2022-05-20 PROCEDURE — 85025 COMPLETE CBC W/AUTO DIFF WBC: CPT

## 2022-05-20 PROCEDURE — 86701 HIV-1ANTIBODY: CPT

## 2022-05-20 PROCEDURE — 85652 RBC SED RATE AUTOMATED: CPT

## 2022-05-20 PROCEDURE — 2580000003 HC RX 258: Performed by: UROLOGY

## 2022-05-20 PROCEDURE — 87390 HIV-1 AG IA: CPT

## 2022-05-20 PROCEDURE — 94760 N-INVAS EAR/PLS OXIMETRY 1: CPT

## 2022-05-20 PROCEDURE — 83036 HEMOGLOBIN GLYCOSYLATED A1C: CPT

## 2022-05-20 PROCEDURE — 80074 ACUTE HEPATITIS PANEL: CPT

## 2022-05-20 RX ORDER — LINEZOLID 2 MG/ML
600 INJECTION, SOLUTION INTRAVENOUS EVERY 12 HOURS
Status: DISCONTINUED | OUTPATIENT
Start: 2022-05-20 | End: 2022-05-21 | Stop reason: HOSPADM

## 2022-05-20 RX ADMIN — OXYCODONE 5 MG: 5 TABLET ORAL at 10:15

## 2022-05-20 RX ADMIN — ENOXAPARIN SODIUM 30 MG: 100 INJECTION SUBCUTANEOUS at 08:50

## 2022-05-20 RX ADMIN — PIPERACILLIN AND TAZOBACTAM 3375 MG: 3; .375 INJECTION, POWDER, LYOPHILIZED, FOR SOLUTION INTRAVENOUS at 04:38

## 2022-05-20 RX ADMIN — OXYCODONE 5 MG: 5 TABLET ORAL at 01:44

## 2022-05-20 RX ADMIN — SODIUM CHLORIDE, PRESERVATIVE FREE 10 ML: 5 INJECTION INTRAVENOUS at 08:55

## 2022-05-20 RX ADMIN — Medication 1250 MG: at 08:52

## 2022-05-20 RX ADMIN — LINEZOLID 600 MG: 600 INJECTION, SOLUTION INTRAVENOUS at 11:52

## 2022-05-20 RX ADMIN — PIPERACILLIN AND TAZOBACTAM 3375 MG: 3; .375 INJECTION, POWDER, LYOPHILIZED, FOR SOLUTION INTRAVENOUS at 20:38

## 2022-05-20 RX ADMIN — PIPERACILLIN AND TAZOBACTAM 3375 MG: 3; .375 INJECTION, POWDER, LYOPHILIZED, FOR SOLUTION INTRAVENOUS at 13:32

## 2022-05-20 RX ADMIN — ENOXAPARIN SODIUM 30 MG: 100 INJECTION SUBCUTANEOUS at 20:37

## 2022-05-20 ASSESSMENT — PAIN - FUNCTIONAL ASSESSMENT: PAIN_FUNCTIONAL_ASSESSMENT: ACTIVITIES ARE NOT PREVENTED

## 2022-05-20 ASSESSMENT — PAIN DESCRIPTION - ORIENTATION
ORIENTATION: RIGHT
ORIENTATION: RIGHT

## 2022-05-20 ASSESSMENT — PAIN DESCRIPTION - ONSET: ONSET: ON-GOING

## 2022-05-20 ASSESSMENT — PAIN DESCRIPTION - LOCATION
LOCATION: STERNUM
LOCATION: SCROTUM

## 2022-05-20 ASSESSMENT — PAIN DESCRIPTION - PAIN TYPE: TYPE: SURGICAL PAIN

## 2022-05-20 ASSESSMENT — PAIN SCALES - GENERAL
PAINLEVEL_OUTOF10: 4
PAINLEVEL_OUTOF10: 4
PAINLEVEL_OUTOF10: 0

## 2022-05-20 ASSESSMENT — PAIN DESCRIPTION - DESCRIPTORS
DESCRIPTORS: SHARP
DESCRIPTORS: SHARP

## 2022-05-20 ASSESSMENT — PAIN DESCRIPTION - FREQUENCY: FREQUENCY: CONTINUOUS

## 2022-05-20 NOTE — PROGRESS NOTES
Hospitalist Progress Note      PCP: No primary care provider on file. Date of Admission: 5/19/2022    Chief Complaint: scrotal abscess    Hospital Course:     Subjective: s/p I/D yesterday, doing well today      Medications:  Reviewed    Infusion Medications    sodium chloride       Scheduled Medications    linezolid  600 mg IntraVENous Q12H    sodium chloride flush  5-40 mL IntraVENous 2 times per day    enoxaparin  30 mg SubCUTAneous BID    piperacillin-tazobactam  3,375 mg IntraVENous Q8H     PRN Meds: sodium chloride flush, sodium chloride, ondansetron **OR** ondansetron, polyethylene glycol, acetaminophen **OR** acetaminophen, oxyCODONE, morphine      Intake/Output Summary (Last 24 hours) at 5/20/2022 1608  Last data filed at 5/20/2022 1156  Gross per 24 hour   Intake 1357.54 ml   Output 400 ml   Net 957.54 ml       Exam:    /74   Pulse 83   Temp 98 °F (36.7 °C) (Oral)   Resp 19   Ht 5' 9\" (1.753 m)   Wt 232 lb 12.9 oz (105.6 kg)   SpO2 94%   BMI 34.38 kg/m²     General appearance: No apparent distress, appears stated age and cooperative. HEENT: Pupils equal, round, and reactive to light. Conjunctivae/corneas clear. Neck: Supple, with full range of motion. No jugular venous distention. Trachea midline. Respiratory:  Normal respiratory effort. Clear to auscultation, bilaterally without Rales/Wheezes/Rhonchi. Cardiovascular: Regular rate and rhythm with normal S1/S2 without murmurs, rubs or gallops. Abdomen: Soft, non-tender, non-distended with normal bowel sounds. Musculoskeletal: No clubbing, cyanosis or edema bilaterally. Full range of motion without deformity. Skin: Skin color, texture, turgor normal.  No rashes or lesions. Defered scrotal skin exam  Neurologic:  Neurovascularly intact without any focal sensory/motor deficits.  Cranial nerves: II-XII intact, grossly non-focal.  Psychiatric: Alert and oriented, thought content appropriate, normal insight  Capillary Refill: Brisk,< 3 seconds   Peripheral Pulses: +2 palpable, equal bilaterally       Labs:   Recent Labs     05/19/22  1220 05/20/22  0436   WBC 17.6* 15.5*   HGB 14.5 13.6   HCT 42.1 39.6*    223     Recent Labs     05/19/22  1220 05/20/22  0436   * 133*   K 4.1 4.7   CL 98* 100   CO2 22 23   BUN 8 9   CREATININE 1.0 1.1   CALCIUM 9.2 9.2     Recent Labs     05/19/22  1220   AST 17   ALT 16   BILITOT 0.8   ALKPHOS 108     No results for input(s): INR in the last 72 hours. No results for input(s): Jay Jay Favre in the last 72 hours. Urinalysis:      Lab Results   Component Value Date    NITRU Negative 05/19/2022    BLOODU Negative 05/19/2022    SPECGRAV 1.007 05/19/2022    GLUCOSEU Negative 05/19/2022       Radiology:  CT PELVIS W CONTRAST Additional Contrast? None   Final Result   Complex right scrotal abscess with air content, measuring 9.4 cm. Reactive right inguinal adenopathy. RECOMMENDATIONS:   Unavailable                 Assessment/Plan:    Active Hospital Problems    Diagnosis Date Noted    Scrotal abscess [N49.2] 05/19/2022     Priority: Medium     Right scrotal abscess status post I&D  - Urology following  - Continue IV vancomycin, Zosyn  - Infectious disease consult  - Continue pain control  - Await culture results    DVT Prophylaxis: lovenox  Diet: ADULT DIET;  Regular  Code Status: Full Code    PT/OT Eval Status: n/a    Dispo - Leeann Freed MD

## 2022-05-20 NOTE — CARE COORDINATION
INITIAL CASE MANAGEMENT ASSESSMENT     Reviewed chart, met with patient to assess possible discharge needs. Explained Case Management role/services. SW interviewed patient alone at bedside today.     Living Situation: Patient reports that he resides in an apartment home with his daughter half of the time. There are no pets. There are fifteen stairs leading up to the front door. Prior to medical admission patient had no trouble getting in and out of the property.      ADLs: Prior to medical admission patient reported that he was independent. He stated that he doesn't anticipate any needs at discharge.      DME: Prior to medical admission patient used no durable medical equipment. He is currently on IVAB and we are waiting on recommendations. We will continue to monitor for needs until discharge.      PT/OT Recs: Not ordered.      Active Services: Prior to medical admission patient reports that he had no active services in place. He stated that he doesn't anticipate any needs at discharge.     Transportation: Prior to medical admission patient reports that he was an active . He stated that his family will likely transport him home at discharge     Medications: [facility] [address]. At this time there are no issues with access or affordability.      PCP: No primary care provider on file. He declines our assistance with finding a provider and stated that he already had a list.     HD/PD: N/A     PLAN/COMMENTS:   1) Waiting on urology and IVAB recs. 2) Possible FMLA needs, patient will let us know. 3) Discharge to home with family.      SW provided contact information for patient or family to call with any questions. SW will follow and assist as needed.     Respectfully submitted,     THOR Ramos  [facility]   [phone]     Electronically signed by THOR Horn on 5/20/2022 at 12:14 PM

## 2022-05-20 NOTE — CARE COORDINATION
[place] [place] [id number] Continence Nurse  Consult Note       NAME:  Tyrone Rivas  MEDICAL RECORD NUMBER:  [id number]  AGE: 27 y.o. GENDER: male  : 1990  TODAY'S DATE:  2022    Subjective   Reason for WOCN Evaluation and Assessment: right scrotal wound      Tyrone Rivas is a 27 y.o. male referred by:   [x] Physician  [] Nursing  [] Other:     Wound Identification:  Wound Type: surgical  Contributing Factors: none    Wound History: pt reports cyst for years that \"burst\" about 2 weeks ago and kept getting larger and more painful with drainage  Current Wound Care Treatment:  packing    Patient Goal of Care:  [x] Wound Healing  [] Odor Control  [] Palliative Care  [] Pain Control   [] Other:         PAST MEDICAL HISTORY    History reviewed. No pertinent past medical history. PAST SURGICAL HISTORY    Past Surgical History:   Procedure Laterality Date    SCROTAL SURGERY Right 2022    SCROTAL INCISION AND DRAINAGE - RIGHT performed by Tr [facility] at [facility]    History reviewed. No pertinent family history. SOCIAL HISTORY    Social History     Tobacco Use    Smoking status: Current Every Day Smoker     Packs/day: 0.50    Smokeless tobacco: Never Used   Substance Use Topics    Alcohol use: Not Currently    Drug use: Never       ALLERGIES    No Known Allergies    MEDICATIONS    No current facility-administered medications on file prior to encounter.      Current Outpatient Medications on File Prior to Encounter   Medication Sig Dispense Refill    sulfamethoxazole-trimethoprim (BACTRIM DS;SEPTRA DS) 800-160 MG per tablet Take 1 tablet by mouth 2 times daily      ibuprofen (ADVIL;MOTRIN) 200 MG tablet Take 400 mg by mouth every 6 hours as needed for Pain         Objective    /74   Pulse 83   Temp 98 °F (36.7 °C) (Oral)   Resp 19   Ht 5' 9\" (1.753 m)   Wt 232 lb 12.9 oz (105.6 kg)   SpO2 94%   BMI 34.38 kg/m²     LABS:  WBC:    Lab Results Component Value Date    WBC 15.5 05/20/2022     H/H:    Lab Results   Component Value Date    HGB 13.6 05/20/2022    HCT 39.6 05/20/2022     PTT:  No results found for: APTT, PTT[APTT}  PT/INR:  No results found for: PROTIME, INR  HgBA1c:  No results found for: LABA1C    Assessment   Dave Risk Score: Dave Scale Score: 20    Patient Active Problem List   Diagnosis Code    Scrotal abscess N49.2       Measurements:         Incision 05/19/22 Scrotum Right (Active)   Wound Image   05/20/22 1300   Dressing Status Breakthrough drainage noted;New dressing applied;Clean;Dry; Intact 05/20/22 1300   Dressing Change Due 05/21/22 05/20/22 1300   Incision Cleansed Cleansed with saline 05/20/22 1300   Dressing/Treatment Moist to moist 05/20/22 1300   Incision Length (cm) 6.5 05/20/22 1300   Incision Width (cm) 3 cm 05/20/22 1300   Incision Depth (cm) 5 cm 05/20/22 1300   Closure Other (Comment) 05/20/22 1300   Margins Other (Comment) 05/20/22 0859   Incision Assessment Dry 05/20/22 1300   Drainage Amount Moderate 05/20/22 1300   Drainage Description Serosanguinous; Sanguinous 05/20/22 1300   Odor None 05/20/22 1300   Angi-incision Assessment Edematous; Induration 05/20/22 1300   Number of days: 0     Pt seen. Anxious about dressing change. Lengthy discussion about pain meds and management, Pt had po oxycodone IR at 1015; would be able to have morphine IV now for dressing change. Pt reluctant to take more pain meds. Agreed to start dressing removal and that we could stop and get IV pain med if needed. Dressing removed. Wound fairly clean. Periwound is indurated. Wound cleansed with saline and repacked with saline dressing and ABD. Secured with mesh underwear. Pt jr well and stated not as bad as he expected and he did not require additional morphine. Only had a few areas that were tender when dressing was removed. Would continue saline dressing through weekend, re-eval for VAC on Monday. Discussed with pt.    If pt discharges over weekend, would recommend saline dressings daily and then follow up in wound clinic. Would likely need HHC to assist with dressing changes. Response to treatment:  Well tolerated by patient. Plan   Plan of Care:    Would continue saline dressing through weekend, re-eval for VAC on Monday. Discussed with pt. If pt discharges over weekend, would recommend saline dressings daily and then follow up in wound clinic. Spoke with Standard Trumann. Specialty Bed Required : No   [] Low Air Loss   [] Pressure Redistribution  [] Fluid Immersion  [] Bariatric  [] Total Pressure Relief  [] Other:     Current Diet: ADULT DIET;  Regular  Dietician consult:  No    Discharge Plan:  Placement for patient upon discharge: home with support    Patient appropriate for Outpatient [address]: Yes    Referrals:  [x]   [] [facility]  [] Supplies  [] Other    Patient/Caregiver Teaching:  Level of patient/caregiver understanding able to:   [] Indicates understanding       [] Needs reinforcement  [] Unsuccessful      [x] Verbal Understanding  [] Demonstrated understanding       [] No evidence of learning  [] Refused teaching         [] N/A       Electronically signed by LAVON Best on 5/20/2022 at 2:05 PM

## 2022-05-20 NOTE — PROGRESS NOTES
Pt Name: Obdulio Valdes Record Number: [id number]  Date of Birth 1990   Today's Date: 5/20/2022      Subjective:  Awake in bed, many questions answered. Pain controlled. Voiding well    ROS: Constitutional: No fever    Vitals:  Vitals:    05/20/22 0338 05/20/22 0345 05/20/22 0722 05/20/22 1123   BP: 110/72  127/78 121/74   Pulse: 88  90 83   Resp: 17 17 19   Temp: 97.9 °F (36.6 °C)  98.2 °F (36.8 °C) 98 °F (36.7 °C)   TempSrc: Oral  Oral Oral   SpO2: 92%  94% 94%   Weight:  232 lb 12.9 oz (105.6 kg)     Height:         I/O last 3 completed shifts: In: 1357.5 [P.O.:480; IV Piggyback:877.5]  Out: -     Exam:  General: Awake, oriented, no acute distress  Respiratory: Nonlabored breathing  Abdomen: Soft, non-tender, non-distended, no masses  : spontaneously voiding, Incision packed - serosang drainage noted. Skin: Skin color, texture, turgor normal, no rashes or lesions  Neurologic: no gross deficits    CURRENT MEDICATIONS   Scheduled Meds:   linezolid  600 mg IntraVENous Q12H    sodium chloride flush  5-40 mL IntraVENous 2 times per day    enoxaparin  30 mg SubCUTAneous BID    piperacillin-tazobactam  3,375 mg IntraVENous Q8H     Continuous Infusions:   sodium chloride       PRN Meds:.sodium chloride flush, sodium chloride, ondansetron **OR** ondansetron, polyethylene glycol, acetaminophen **OR** acetaminophen, oxyCODONE, morphine    LABS     Recent Labs     05/19/22  1220 05/20/22  0436   WBC 17.6* 15.5*   HGB 14.5 13.6   HCT 42.1 39.6*    223   * 133*   K 4.1 4.7   CL 98* 100   CO2 22 23   BUN 8 9   CREATININE 1.0 1.1   CALCIUM 9.2 9.2   AST 17  --    ALT 16  --    BILITOT 0.8  --        ASSESSMENT   1. Hospital day # 1  2. 32y. o. male s/p I&D of scrotal abscess with Dr. Ugo Cat 5/19/22  PLAN   1. Wound care and ID consults pending  2. Blood cultures and Cultures from OR pending  3. Pain control  4.  Continue antibiotics     CINTHIA Miles - BLESSING 5/20/2022

## 2022-05-20 NOTE — PROGRESS NOTES
Pt is alert and oriented x4, resting quietly in bed. Nightly medications and intake tolerated well. No complaints of nausea or vomiting. Pt complaining of some pain - prn pain medications given as prescribed. Dressing remains clean, dry, and intact. No other needs made known at this time. Fall precautions in place. Call light within reach. Will continue to monitor.     Electronically signed by Fermin Arauz RN on 5/20/2022 at 12:12 AM

## 2022-05-20 NOTE — PROGRESS NOTES
Patient is resting in bed, awake and quiet. Fall precautions are in place. Patient denies pain at present. Dressing to scrotum is clean, dry and intact. Will continue to monitor patient per unit protocols.  Electronically signed by Dimas Thompson RN on 5/20/2022 at 5:36 PM

## 2022-05-20 NOTE — PLAN OF CARE
Problem: Discharge Planning  Goal: Discharge to home or other facility with appropriate resources  Outcome: Progressing  Note: Patient educated on discharge plan and assessed to determine that needs are being met. Questions answered for patient. Patient encouraged to ask questions and voice concerns/comments in regards to barriers for discharge and any other questions about the plan of care. Problem: Pain  Goal: Verbalizes/displays adequate comfort level or baseline comfort level  Outcome: Progressing  Note: Assessing and monitoring pt's pain. PRN pain medication being given if ordered. Educating pt on nonpharmacologic interventions for pain control. Will continue to monitor and reassess.        Problem: ABCDS Injury Assessment  Goal: Absence of physical injury  Outcome: Progressing

## 2022-05-20 NOTE — OP NOTE
[address], [address] [place] [address]                                OPERATIVE REPORT    PATIENT NAME: Regulo Daily                      :        1990  MED REC NO:   [id number]                          ROOM:       3104  ACCOUNT NO:   [de-identified]                           ADMIT DATE: 2022  PROVIDER:     Ronal Vicente MD      DATE OF PROCEDURE:  2022    PREOPERATIVE DIAGNOSIS:  Large right scrotal abscess. POSTOPERATIVE DIAGNOSIS:  Large right scrotal abscess. PROCEDURE PERFORMED:  Incision and drainage of large right loculated  scrotal abscess. SURGEON:  Ronal Vicente MD    ANESTHESIA:  General.    COMPLICATIONS:  None. BLOOD LOSS:  Minimal.    SPECIMENS:  Culture sent. INDICATIONS:  A 27year-old otherwise healthy gentleman presented with  ongoing scrotal swelling for approximately the past week. 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 On exam, he had significant induration and erythema of the right  scrotum with erythema extending into the right thigh and groin skin. There are three draining sinuses of the superior aspect of the scrotum  on the right side. A CAT scan showed an abscess of the right scrotum. He is here for incision and drainage. PROCEDURE:  He was given IV clindamycin and Zosyn. He was taken to the  operative suite. He moved onto the table. Anesthesia was induced. His  position was changed to the lithotomy position. His genitalia were  prepped and draped. A vertical incision was made over the draining  sinuses in the scrotum and immediately we entered the abscess cavity  with a large volume purulent drainage identified. This was suctioned  out of the cavity. The drainage was quite foul smelling. A swab was  used to send this for culture. It appeared that there was some sort of  previously present possible cyst as there was a rind identified which  seemed to be surrounding the abscess. A large volume of the rind wall  was sharply excised. The loculations were broken up with blunt  dissection. At the end of the procedure, there was no further purulent  drainage. There was a wide open abscess cavity. The incision overall  measured about 10 cm. Electrocautery was used to obtain hemostasis. There was some necrotic tissue which was sharply debrided. The area was  irrigated and then a saline-soaked Kerlix gauze packing was placed  followed by a dry sterile dressing. The patient was then awakened and  transferred to recovery having tolerated the procedure well. He will be  admitted to the hospitalist service. He will need a wound care consult  and be continued on broad-spectrum antibiotics while the cultures are  pending.         Chico Penaloza MD    D: 05/19/2022 18:22:22       T: 05/19/2022 18:25:59     [id number]  Job#: [id number]     Doc#: [id number]    CC:

## 2022-05-20 NOTE — CONSULTS
Infectious Diseases Inpatient Consult Note      Reason for Consult:   Rt hemiscrotum abscess s/p ID and WBC elevation     Requesting Physician:       Primary Care Physician:  No primary care provider on file. History Obtained From:  Epic and patient     CHIEF COMPLAINT:     Chief Complaint   Patient presents with    Abscess     Has a cyst or abscess on his groin and states very painful and he has had some fevers, no fever today         HISTORY OF PRESENT ILLNESS:  27 y.o. man with morbid obesity BMI at  34.3, poor dentition, pilonidal cyst surgery in the past admitted with Rt scrotal pain, swelling, redness and fevers, he had skin cyst for many months/years per patient and recently while playing Volleyball he dove forward and sustained injury to the Rt scrotum since more pain, redness and swelling presented with WBC elevation, fevers CT abd/pelvis with Rt scrotal abscess seen by Urology taken to OR on 5/19/22 and s/p ID drainage of purulence and removal of cyst rind along the abscess, OR cx in process we are consulted for recommendations. Location : Rt  Blaise scrotal pain, swelling redness       Quality : aching        Severity : 10/10     Duration : 4 days       Timing : constant   Context :  Trauma and cyst     Modifying factors : none   Associated signs and symptoms:fever, redness, swelling, pain +       Past Medical History:    History reviewed. No pertinent past medical history.     Past Surgical History:    Past Surgical History:   Procedure Laterality Date    SCROTAL SURGERY Right 5/19/2022    SCROTAL INCISION AND DRAINAGE - RIGHT performed by Mikaela Cobb MD at [place] [address]       Current Medications:    Outpatient Medications Marked as Taking for the 5/19/22 encounter [facility] Encounter)   Medication Sig Dispense Refill    sulfamethoxazole-trimethoprim (BACTRIM DS;SEPTRA DS) 800-160 MG per tablet Take 1 tablet by mouth 2 times daily      ibuprofen (ADVIL;MOTRIN) 200 MG tablet Take 400 mg by mouth every 6 hours as needed for Pain         Allergies:  Patient has no known allergies. Immunizations : There is no immunization history on file for this patient. Social History:     Social History     Tobacco Use    Smoking status: Current Every Day Smoker     Packs/day: 0.50    Smokeless tobacco: Never Used   Substance Use Topics    Alcohol use: Not Currently    Drug use: Never     Social History     Tobacco Use   Smoking Status Current Every Day Smoker    Packs/day: 0.50   Smokeless Tobacco Never Used     Family history : no DVT no copd      REVIEW OF SYSTEMS:     Constitutional:    fevers + , chills, night sweats  Eyes:  negative for blurred vision, eye discharge, visual disturbance   HEENT:  negative for hearing loss, ear drainage,nasal congestion  Respiratory:  negative for cough, shortness of breath or hemoptysis   Cardiovascular:  negative for chest pain, palpitations, syncope  Gastrointestinal:  negative for nausea, vomiting, diarrhea, constipation, abdominal pain  Genitourinary:  negative for frequency, dysuria, urinary incontinence, hematuria Rt scrotal pain, swelling + redness++   Hematologic/Lymphatic:  negative for easy bruising, bleeding and lymphadenopathy  Allergic/Immunologic:  negative for recurrent infections, angioedema, anaphylaxis   Endocrine:  negative for weight changes, polyuria, polydipsia and polyphagia  Musculoskeletal:  negative for joint  pain, swelling, decreased range of motion  Integumentary: No rashes, skin lesions  Neurological:  negative for headaches, slurred speech, unilateral weakness  Psychiatric: negative for hallucinations,confusion,agitation.      PHYSICAL EXAM:      Vitals:    /78   Pulse 90   Temp 98.2 °F (36.8 °C) (Oral)   Resp 17   Ht 5' 9\" (1.753 m)   Wt 232 lb 12.9 oz (105.6 kg)   SpO2 94%   BMI 34.38 kg/m²     General Appearance: alert,in some acute distress, no pallor, no icterus  Extremely poor dentition+++   Skin: warm and dry, no rash or erythema  Head: normocephalic and atraumatic  Eyes: pupils equal, round, and reactive to light, conjunctivae normal  ENT: tympanic membrane, external ear and ear canal normal bilaterally, nose without deformity, nasal mucosa and turbinates normal without polyps  Neck: supple and non-tender without mass, no thyromegaly  no cervical lymphadenopathy  Pulmonary/Chest: clear to auscultation bilaterally- no wheezes, rales or rhonchi, normal air movement, no respiratory distress  Cardiovascular: normal rate, regular rhythm, normal S1 and S2, no murmurs, rubs, clicks, or gallops, no carotid bruits  Abdomen: soft, non-tender, non-distended, normal bowel sounds, no masses or organomegaly  Extremities: no cyanosis, clubbing or edema  Musculoskeletal: normal range of motion, no joint swelling, deformity or tenderness  Integumentary: No rashes, no abnormal skin lesions, no petechiae  Neurologic: reflexes normal and symmetric, no cranial nerve deficit  Psych:  Orientation, sensorium, mood normal   Lines: IV  Rt tiki scrotum packing local redess     DATA:    CBC:   Lab Results   Component Value Date    WBC 15.5 (H) 05/20/2022    HGB 13.6 05/20/2022    HCT 39.6 (L) 05/20/2022    MCV 86.6 05/20/2022     05/20/2022     RENAL:   Lab Results   Component Value Date    CREATININE 1.1 05/20/2022    BUN 9 05/20/2022     (L) 05/20/2022    K 4.7 05/20/2022     05/20/2022    CO2 23 05/20/2022     SED RATE: No results found for: SEDRATE  CK: No results found for: CKTOTAL  CRP: No results found for: CRP  Hepatic Function Panel:   Lab Results   Component Value Date    ALKPHOS 108 05/19/2022    ALT 16 05/19/2022    AST 17 05/19/2022    PROT 8.2 05/19/2022    BILITOT 0.8 05/19/2022    LABALBU 4.5 05/19/2022     UA:  Lab Results   Component Value Date    COLORU Yellow 05/19/2022    CLARITYU Clear 05/19/2022    GLUCOSEU Negative 05/19/2022    BILIRUBINUR Negative 05/19/2022    KETUA Negative 05/19/2022 SPECGRAV 1.007 05/19/2022    BLOODU Negative 05/19/2022    PHUR 7.0 05/19/2022    PROTEINU Negative 05/19/2022    UROBILINOGEN 0.2 05/19/2022    NITRU Negative 05/19/2022    LEUKOCYTESUR Negative 05/19/2022    LABMICR Not Indicated 05/19/2022    URINETYPE NotGiven 05/19/2022      Urine Microscopic: No results found for: LABCAST, BACTERIA, COMU, HYALCAST, WBCUA, RBCUA, EPIU  Urine Reflex to Culture:   Lab Results   Component Value Date    URRFLXCULT Not Indicated 05/19/2022     WBC   15.5       MICRO: cultures reviewed and updated by me          Culture, Wound [[id number]] Collected: 05/19/22 1200   Order Status: Completed Specimen: Scrotum Updated: 05/20/22 0923    WOUND/ABSCESS No growth to date   Narrative:     ORDER#: [id number]                          ORDERED BY: Neto Mcdonnell   SOURCE: Scrotum                            COLLECTED:  05/19/22 12:00   ANTIBIOTICS AT [place]. :                      RECEIVED :  05/19/22 12:30   Culture with Smear, Acid Fast Bacillius [[id number]] Collected: 05/19/22 1744   Order Status: Sent Specimen: Body Fluid Updated: 05/20/22 0532    AFB Smear Further report to follow   Narrative:     ORDER#: [id number]                          ORDERED BY: Capri Abarca   SOURCE: Fluid                              COLLECTED:  05/19/22 17:44   ANTIBIOTICS AT [facility]. :                      RECEIVED :  05/19/22 18:38   Culture, Fungus [[id number]] Collected: 05/19/22 1744   Order Status: Sent Specimen: Body Fluid Updated: 05/20/22 0501    Fungus Stain No Fungal elements seen   Narrative:     ORDER#: [id number]                          ORDERED BY: VJ BELLE   SOURCE: Fluid                              COLLECTED:  05/19/22 17:44   ANTIBIOTICS AT [facility]. :                      RECEIVED :  05/19/22 18:38   Culture, Anaerobic and Aerobic [[id number]] Collected: 05/19/22 1744   Order Status: Completed Specimen:  Body Fluid Updated: 05/19/22 1843    Anaerobic Culture CANCELED    Comment: Result canceled by the ancillary. Narrative:     ORDER#: [id number]                          ORDERED BY: VJ BELLE   SOURCE: Fluid                              COLLECTED:  05/19/22 17:44   ANTIBIOTICS AT [facility]. :                      RECEIVED :  05/19/22 18:37   Culture, Wound [[id number]]    Order Status: No result Specimen: Abscess    Culture, Blood 1 [[id number]]    Order Status: No result Specimen: Blood    Culture, Blood 2 [[id number]]    Order Status: No result Specimen: Blood    Culture, Body Fluid [[id number]] Collected: 05/19/22 1744   Order Status: Canceled Specimen: Body Fluid    Culture, Blood 2 [[id number]] Collected: 05/19/22 1451   Order Status: Sent Specimen: Blood Updated: 05/19/22 1511   Culture, Blood 1 [[id number]] Collected: 05/19/22 1451   Order Status: Sent Specimen: Blood Updated: 05/19/22 1511           Blood Culture: No results found for: BC, BLOODCULT2    Viral Culture:    No results found for: COVID19  Urine Culture: No results for input(s): [place] in the last 72 hours. Scheduled Meds:   sodium chloride flush  5-40 mL IntraVENous 2 times per day    enoxaparin  30 mg SubCUTAneous BID    piperacillin-tazobactam  3,375 mg IntraVENous Q8H    vancomycin  1,250 mg IntraVENous Q12H    vancomycin (VANCOCIN) intermittent dosing (placeholder)   Other RX Placeholder       Continuous Infusions:   sodium chloride         PRN Meds:  sodium chloride flush, sodium chloride, ondansetron **OR** ondansetron, polyethylene glycol, acetaminophen **OR** acetaminophen, oxyCODONE, morphine    Imaging:   CT PELVIS W CONTRAST Additional Contrast? None   Final Result   Complex right scrotal abscess with air content, measuring 9.4 cm. Reactive right inguinal adenopathy. RECOMMENDATIONS:   Unavailable             All pertinent images and reports for the current Hospitalization were reviewed by me.     IMPRESSION:    Patient Active Problem List   Diagnosis    Scrotal abscess       Scrotal abscess  Large  Scrotal cellulitis  WBC elevation  CT abd/plevis with  Complex Rt scrotal abscess with gas  Rt inguinal adenopathy   Obesity BMI at 34  Poor dentition+   Smoking+  S/p Rt scrotal abscess drainage  No h/o STD per patient     Given the presentation suspect secondary infection of the existing skin cyst and likely mixed infectious  Process vs skin ernesto     Will check for  STD and other pathogens and screen for HIV and Hepatitis    Labs, Microbiology, Radiology and pertinent results from current hospitalization and care every where were reviewed by me as a part of the consultation. PLAN :  1. Cont IV Zosyn x 3.375  Mg x q 8 hrs  2. D/C IV Vancomycin   3. IV Linezolid 600  Mg Q 12 hr  4. ESR, CRP  5. Check HIV, Hepatitis, RPR   6. Urine for Gonorrhea and chlamydia  7. Home going hopefully on oral abx once better and cx are final ? Tomorrow   8. Trend WBC  - check in AM   9. Needs dental visit - d/w pt     Discussed with patient/Family and Nursing   Risk of Complications/Morbidity: High      · Illness(es)/ Infection present that pose threat to bodily function. · There is potential for severe exacerbation of infection/side effects of treatment. · Therapy requires intensive monitoring for antimicrobial agent toxicity. Thanks for allowing me to participate in your patient's care please call me with any questions or concerns.     Dr. Mari Chong MD  [address] [place] [address] Physician  Phone: [phone]   Fax : [phone]

## 2022-05-20 NOTE — PLAN OF CARE
Problem: Discharge Planning  Goal: Discharge to home or other facility with appropriate resources  5/20/2022 0754 by Konrad Odonnell RN  Outcome: Progressing     Problem: Pain  Goal: Verbalizes/displays adequate comfort level or baseline comfort level  5/20/2022 0754 by Konrad Odonnell RN  Outcome: Progressing   Patient educated on acute pain. Taught patient to use call light to ask for pain medication. PRN pain medication given for acute pain. Will continue to monitor pain per unit protocol.     Problem: ABCDS Injury Assessment  Goal: Absence of physical injury  5/20/2022 0754 by Konrad Odonnell RN  Outcome: Progressing

## 2022-05-20 NOTE — DISCHARGE INSTR - COC
Continuity of Care Form    Patient Name: Julita Ibrahim   :  1990  MRN:  [id number]    Admit date:  2022  Discharge date:  ***    Code Status Order: Full Code   Advance Directives:      Admitting Physician:  Eda Ash MD  PCP: No primary care provider on file. Discharging Nurse: [facility] Unit/Room#: P2P-4205/3104-01  Discharging Unit Phone Number: ***    Emergency Contact:   Extended Emergency Contact Information  Primary Emergency Contact: Jose Vanegas  Address: [address] [place] [address], [address] [place] [address],[address]  Home Phone: [phone]  Relation: Other  Secondary Emergency Contact: Jose Vanegas  Address: [facility], [address]  Home Phone: [phone]  Relation: Other    Past Surgical History:  Past Surgical History:   Procedure Laterality Date    SCROTAL SURGERY Right 2022    SCROTAL INCISION AND DRAINAGE - RIGHT performed by Moshe Ley MD at [place] [address]       Immunization History: There is no immunization history on file for this patient.     Active Problems:  Patient Active Problem List   Diagnosis Code    Scrotal abscess N49.2       Isolation/Infection:   Isolation            No Isolation          Patient Infection Status       None to display            Nurse Assessment:  Last Vital Signs: /74   Pulse 83   Temp 98 °F (36.7 °C) (Oral)   Resp 19   Ht 5' 9\" (1.753 m)   Wt 232 lb 12.9 oz (105.6 kg)   SpO2 94%   BMI 34.38 kg/m²     Last documented pain score (0-10 scale): Pain Level: 4  Last Weight:   Wt Readings from Last 1 Encounters:   22 232 lb 12.9 oz (105.6 kg)     Mental Status:  {IP PT MENTAL STATUS:}    IV Access:  {[facility] IV ACCESS:368738170}    Nursing Mobility/ADLs:  Walking   {CHP DME FUON:834757480}  Transfer  {CHP DME PWUW:580788088}  Bathing  {CHP DME BPKF:375249528}  Dressing  {CHP DME EUGM:366453355}  Toileting  {CHP DME CPJA:108763248}  Feeding  {CHP DME BSXT:397614579}  Med Admin {CHP DME ZAAY:127854407}  Med Delivery   { ANIYAH MED APQY:129680018}    Wound Care Documentation and Therapy:  Incision 05/19/22 Scrotum Right (Active)   Wound Image   05/20/22 1300   Dressing Status Breakthrough drainage noted;New dressing applied;Clean;Dry; Intact 05/20/22 1300   Dressing Change Due 05/21/22 05/20/22 1300   Incision Cleansed Cleansed with saline 05/20/22 1300   Dressing/Treatment Moist to moist 05/20/22 1300   Incision Length (cm) 6.5 05/20/22 1300   Incision Width (cm) 3 cm 05/20/22 1300   Incision Depth (cm) 5 cm 05/20/22 1300   Closure Other (Comment) 05/20/22 1300   Margins Other (Comment) 05/20/22 0859   Incision Assessment Dry 05/20/22 1300   Drainage Amount Moderate 05/20/22 1300   Drainage Description Serosanguinous; Sanguinous 05/20/22 1300   Odor None 05/20/22 1300   Angi-incision Assessment Edematous; Induration 05/20/22 1300   Number of days: 0     Elimination:  Continence: Bowel: {YES / ID:40874}  Bladder: {YES / EY:36978}  Urinary Catheter: {Urinary Catheter:148313834}   Colostomy/Ileostomy/Ileal Conduit: {YES / IN:46267}       Date of Last BM: ***    Intake/Output Summary (Last 24 hours) at 5/20/2022 1421  Last data filed at 5/20/2022 1156  Gross per 24 hour   Intake 1357.54 ml   Output 400 ml   Net 957.54 ml     I/O last 3 completed shifts:   In: 1357.5 [P.O.:480; IV Piggyback:877.5]  Out: -     Safety Concerns:     [address] Safety Concerns:284286024}    Impairments/Disabilities:      [address] Impairments/Disabilities:180201426}    Nutrition Therapy:  Current Nutrition Therapy:   [address] Diet List:883630725}    Routes of Feeding: {CHP DME Other Feedings:137092270}  Liquids: {Slp liquid thickness:51838}  Daily Fluid Restriction: {CHP DME Yes amt example:474635395}  Last Modified Barium Swallow with Video (Video Swallowing Test): {Done Not Done WWPZ:422638125}    Treatments at the Time of Hospital Discharge:   Respiratory Treatments: ***  Oxygen Therapy:  {Therapy; copd oxygen:13835}  Ventilator:    { CC Vent PMAJ:085502820}    Rehab Therapies: {THERAPEUTIC INTERVENTION:3370829500}  Weight Bearing Status/Restrictions: { CC Weight Bearin}  Other Medical Equipment (for information only, NOT a DME order):  {EQUIPMENT:439765466}  Other Treatments: ***    Patient's personal belongings (please select all that are sent with patient):  {CHP DME Belongings:935916584}    RN SIGNATURE:  {Esignature:477105880}    CASE MANAGEMENT/SOCIAL WORK SECTION    Inpatient Status Date: ***    Readmission Risk Assessment Score:  Readmission Risk              Risk of Unplanned Readmission:  5           Discharging to Facility/ Agency   Name:   Address:  Phone:  Fax:    Dialysis Facility (if applicable)   Name:  Address:  Dialysis Schedule:  Phone:  Fax:    / signature: {Esignature:723633762}    PHYSICIAN SECTION    Prognosis: {Prognosis:1141950992}    Condition at Discharge: [address] [place] [address] Patient Condition:057681868}    Rehab Potential (if transferring to Rehab): {Prognosis:5884878366}    Recommended Labs or Other Treatments After Discharge: ***    Physician Certification: I certify the above information and transfer of Sary Chamberlain  is necessary for the continuing treatment of the diagnosis listed and that he requires {Admit to Appropriate Level of Care:56326} for {GREATER/LESS:481310985} 30 days.      Update Admission H&P: {CHP DME Changes in ENAGQ:583052359}    PHYSICIAN SIGNATURE:  {Esignature:985089482}

## 2022-05-21 VITALS
SYSTOLIC BLOOD PRESSURE: 134 MMHG | RESPIRATION RATE: 20 BRPM | TEMPERATURE: 97.9 F | BODY MASS INDEX: 36.83 KG/M2 | HEART RATE: 74 BPM | WEIGHT: 248.68 LBS | OXYGEN SATURATION: 96 % | DIASTOLIC BLOOD PRESSURE: 82 MMHG | HEIGHT: 69 IN

## 2022-05-21 LAB
C. TRACHOMATIS DNA ,URINE: NEGATIVE
ESTIMATED AVERAGE GLUCOSE: 105.4 MG/DL
HBA1C MFR BLD: 5.3 %
N. GONORRHOEAE DNA, URINE: NEGATIVE

## 2022-05-21 PROCEDURE — 2580000003 HC RX 258: Performed by: INTERNAL MEDICINE

## 2022-05-21 PROCEDURE — 2580000003 HC RX 258: Performed by: UROLOGY

## 2022-05-21 PROCEDURE — 6370000000 HC RX 637 (ALT 250 FOR IP): Performed by: UROLOGY

## 2022-05-21 PROCEDURE — 99232 SBSQ HOSP IP/OBS MODERATE 35: CPT | Performed by: INTERNAL MEDICINE

## 2022-05-21 PROCEDURE — 6360000002 HC RX W HCPCS: Performed by: INTERNAL MEDICINE

## 2022-05-21 PROCEDURE — 94760 N-INVAS EAR/PLS OXIMETRY 1: CPT

## 2022-05-21 PROCEDURE — 6360000002 HC RX W HCPCS: Performed by: UROLOGY

## 2022-05-21 RX ORDER — DOXYCYCLINE HYCLATE 100 MG
100 TABLET ORAL 2 TIMES DAILY
Qty: 20 TABLET | Refills: 0 | Status: SHIPPED | OUTPATIENT
Start: 2022-05-21 | End: 2022-05-31

## 2022-05-21 RX ORDER — AMOXICILLIN AND CLAVULANATE POTASSIUM 875; 125 MG/1; MG/1
1 TABLET, FILM COATED ORAL 2 TIMES DAILY
Qty: 20 TABLET | Refills: 0 | Status: SHIPPED | OUTPATIENT
Start: 2022-05-21 | End: 2022-05-31

## 2022-05-21 RX ORDER — OXYCODONE HYDROCHLORIDE 5 MG/1
5 TABLET ORAL EVERY 8 HOURS PRN
Qty: 9 TABLET | Refills: 0 | Status: SHIPPED | OUTPATIENT
Start: 2022-05-21 | End: 2022-05-24

## 2022-05-21 RX ADMIN — OXYCODONE 5 MG: 5 TABLET ORAL at 17:07

## 2022-05-21 RX ADMIN — LINEZOLID 600 MG: 600 INJECTION, SOLUTION INTRAVENOUS at 11:53

## 2022-05-21 RX ADMIN — PIPERACILLIN AND TAZOBACTAM 3375 MG: 3; .375 INJECTION, POWDER, LYOPHILIZED, FOR SOLUTION INTRAVENOUS at 13:32

## 2022-05-21 RX ADMIN — ENOXAPARIN SODIUM 30 MG: 100 INJECTION SUBCUTANEOUS at 08:02

## 2022-05-21 RX ADMIN — SODIUM CHLORIDE, PRESERVATIVE FREE 10 ML: 5 INJECTION INTRAVENOUS at 08:02

## 2022-05-21 RX ADMIN — LINEZOLID 600 MG: 600 INJECTION, SOLUTION INTRAVENOUS at 00:41

## 2022-05-21 RX ADMIN — PIPERACILLIN AND TAZOBACTAM 3375 MG: 3; .375 INJECTION, POWDER, LYOPHILIZED, FOR SOLUTION INTRAVENOUS at 05:26

## 2022-05-21 ASSESSMENT — PAIN DESCRIPTION - ONSET: ONSET: GRADUAL

## 2022-05-21 ASSESSMENT — PAIN DESCRIPTION - LOCATION: LOCATION: SCROTUM

## 2022-05-21 ASSESSMENT — PAIN SCALES - GENERAL
PAINLEVEL_OUTOF10: 0
PAINLEVEL_OUTOF10: 7

## 2022-05-21 ASSESSMENT — PAIN DESCRIPTION - PAIN TYPE: TYPE: ACUTE PAIN

## 2022-05-21 ASSESSMENT — PAIN DESCRIPTION - DESCRIPTORS: DESCRIPTORS: ACHING

## 2022-05-21 ASSESSMENT — PAIN - FUNCTIONAL ASSESSMENT: PAIN_FUNCTIONAL_ASSESSMENT: ACTIVITIES ARE NOT PREVENTED

## 2022-05-21 ASSESSMENT — PAIN DESCRIPTION - FREQUENCY: FREQUENCY: INTERMITTENT

## 2022-05-21 NOTE — PROGRESS NOTES
Infectious Disease Follow up Notes  Admit Date: 5/19/2022  Hospital Day: 3    Antibiotics : Iv LiNEZOLID  IV ZOSYN    CHIEF COMPLAINT:       Scrotal abscess   WBC elevation     Subjective interval History :  27 y.o.  man with morbid obesity BMI at  34.3, poor dentition, pilonidal cyst surgery in the past admitted with Rt scrotal pain, swelling, redness and fevers, he had skin cyst for many months/years per patient and recently while playing Volleyball he dove forward and sustained injury to the Rt scrotum since more pain, redness and swelling presented with WBC elevation, fevers CT abd/pelvis with Rt scrotal abscess seen by Urology taken to OR on 5/19/22 and s/p ID drainage of purulence and removal of cyst rind along the abscess, OR cx in process we are consulted for recommendations. Location : Rt  Blaise scrotal pain, swelling redness       Quality : aching        Severity : 10/10     Duration : 4 days       Timing : constant   Context :  Trauma and cyst     Modifying factors : none   Associated signs and symptoms:fever, redness, swelling, pain +     Interval History : Pain going down packing + no fevers and wound vac is being considered as out patient per patient and OR cx negative       Past Medical History:    History reviewed. No pertinent past medical history.     Past Surgical History:    Past Surgical History:   Procedure Laterality Date    SCROTAL SURGERY Right 5/19/2022    SCROTAL INCISION AND DRAINAGE - RIGHT performed by Gordo Roger MD at [place] [address]       Current Medications:    Outpatient Medications Marked as Taking for the 5/19/22 encounter [facility] Encounter)   Medication Sig Dispense Refill    sulfamethoxazole-trimethoprim (BACTRIM DS;SEPTRA DS) 800-160 MG per tablet Take 1 tablet by mouth 2 times daily      ibuprofen (ADVIL;MOTRIN) 200 MG tablet Take 400 mg by mouth every 6 hours as needed for Pain Allergies:  Patient has no known allergies. Immunizations : There is no immunization history on file for this patient. Social History:    Social History     Tobacco Use    Smoking status: Current Every Day Smoker     Packs/day: 0.50    Smokeless tobacco: Never Used   Substance Use Topics    Alcohol use: Not Currently    Drug use: Never     Social History     Tobacco Use   Smoking Status Current Every Day Smoker    Packs/day: 0.50   Smokeless Tobacco Never Used      Family History  : no DVT no COPD        REVIEW OF SYSTEMS:     Constitutional:  negative for fevers, chills, night sweats  Eyes:  negative for blurred vision, eye discharge, visual disturbance   HEENT:  negative for hearing loss, ear drainage,nasal congestion  Respiratory:  negative for cough, shortness of breath or hemoptysis   Cardiovascular:  negative for chest pain, palpitations, syncope  Gastrointestinal:  negative for nausea, vomiting, diarrhea, constipation, abdominal pain  Genitourinary:  negative for frequency, dysuria, urinary incontinence, hematuria  Hematologic/Lymphatic:  negative for easy bruising, bleeding and lymphadenopathy  Allergic/Immunologic:  negative for recurrent infections, angioedema, anaphylaxis   Endocrine:  negative for weight changes, polyuria, polydipsia and polyphagia  Musculoskeletal:  Scrotal pain + swelling+   pain, swelling, decreased range of motion  Integumentary: No rashes, skin lesions  Neurological:  negative for headaches, slurred speech, unilateral weakness  Psychiatric: negative for hallucinations,confusion,agitation.                 PHYSICAL EXAM:      Vitals:    /82   Pulse 74   Temp 97.9 °F (36.6 °C) (Oral)   Resp 20   Ht 5' 9\" (1.753 m)   Wt 248 lb 10.9 oz (112.8 kg)   SpO2 96%   BMI 36.72 kg/m²     General Appearance: alert,in some acute distress, no pallor, no icterus  Extremely poor dentition+++   Skin: warm and dry, no rash or erythema  Head: normocephalic and atraumatic  Eyes: pupils equal, round, and reactive to light, conjunctivae normal  ENT: tympanic membrane, external ear and ear canal normal bilaterally, nose without deformity, nasal mucosa and turbinates normal without polyps  Neck: supple and non-tender without mass, no thyromegaly  no cervical lymphadenopathy  Pulmonary/Chest: clear to auscultation bilaterally- no wheezes, rales or rhonchi, normal air movement, no respiratory distress  Cardiovascular: normal rate, regular rhythm, normal S1 and S2, no murmurs, rubs, clicks, or gallops, no carotid bruits  Abdomen: soft, non-tender, non-distended, normal bowel sounds, no masses or organomegaly  Extremities: no cyanosis, clubbing or edema  Musculoskeletal: normal range of motion, no joint swelling, deformity or tenderness  Integumentary: No rashes, no abnormal skin lesions, no petechiae  Neurologic: reflexes normal and symmetric, no cranial nerve deficit  Psych:  Orientation, sensorium, mood normal            Lines: IV  Rt tiki scrotum packing local redess        Data Review:    CBC:   Lab Results   Component Value Date    WBC 15.5 (H) 05/20/2022    HGB 13.6 05/20/2022    HCT 39.6 (L) 05/20/2022    MCV 86.6 05/20/2022     05/20/2022     RENAL:   Lab Results   Component Value Date    CREATININE 1.1 05/20/2022    BUN 9 05/20/2022     (L) 05/20/2022    K 4.7 05/20/2022     05/20/2022    CO2 23 05/20/2022     SED RATE:   Lab Results   Component Value Date    SEDRATE 95 05/20/2022     CK: No results found for: CKTOTAL  CRP:   Lab Results   Component Value Date    .0 05/20/2022     Hepatic Function Panel:   Lab Results   Component Value Date    ALKPHOS 108 05/19/2022    ALT 16 05/19/2022    AST 17 05/19/2022    PROT 8.2 05/19/2022    BILITOT 0.8 05/19/2022    LABALBU 4.5 05/19/2022     UA:  Lab Results   Component Value Date    COLORU Yellow 05/19/2022    CLARITYU Clear 05/19/2022    GLUCOSEU Negative 05/19/2022    BILIRUBINUR Negative 05/19/2022 KETUA Negative 2022    SPECGRAV 1.007 2022    BLOODU Negative 2022    PHUR 7.0 2022    PROTEINU Negative 2022    UROBILINOGEN 0.2 2022    NITRU Negative 2022    LEUKOCYTESUR Negative 2022    LABMICR Not Indicated 2022    URINETYPE NotGiven 2022      Urine Microscopic: No results found for: LABCAST, BACTERIA, COMU, HYALCAST, WBCUA, RBCUA, EPIU  Urine Reflex to Culture:   Lab Results   Component Value Date    URRFLXCULT Not Indicated 2022         MICRO: cultures reviewed and updated by me   Blood Culture:   Procedure Component Value Units Date/Time   Culture, Anaerobic and Aerobic [[id number]] Collected: 22 1744   Order Status: Completed Specimen: Body Fluid Updated: 22 0718    Gram Stain Result 2+ WBC's (Polymorphonuclear)   3+ Gram positive cocci   3+ Gram negative rods     WOUND/ABSCESS --    No growth to date   No growth 36 to 48 hours     Anaerobic Culture CANCELED    Comment: Result canceled by the ancillary. Narrative:     ORDER#: [id number]                          ORDERED BY: VJ BELLE   SOURCE: Abscess Scrotum                    COLLECTED:  22 17:44   ANTIBIOTICS AT [facility]. :                      RECEIVED :  22 18:37   received in  media; please interpret results with caution   exp    Culture, Wound [[id number]] Collected: 22 1200   Order Status: Completed Specimen: Scrotum Updated: 22 0718    WOUND/ABSCESS --    No growth to date   No growth 36 to 48 hours     Gram Stain Result 1+ WBC's   2+ Gram positive cocci   2+ Gram negative rods    Narrative:     ORDER#: [id number]                          ORDERED BY: Pastora Campbell   SOURCE: Scrotum                            COLLECTED:  22 12:00   ANTIBIOTICS AT [facility]. :                      RECEIVED :  22 12:30   C.trachomatis N.gonorrhoeae DNA, Urine [[id number]] Collected: 22 1150   Order Status: Completed Specimen: Urine Updated: 05/21/22 0244    C. trachomatis DNA ,Urine Negative    N. gonorrhoeae DNA, Urine Negative   Culture, Blood 1 [[id number]] Collected: 05/19/22 1451   Order Status: Completed Specimen: Blood Updated: 05/20/22 1717    Blood Culture, Routine No Growth to date.  Any change in status will be called. Narrative:     ORDER#: [id number]                          ORDERED BY: Donya Bergeron   SOURCE: Blood                              COLLECTED:  05/19/22 14:51   ANTIBIOTICS AT [facility]. :                      RECEIVED :  05/19/22 14:58   If child <=2 yrs old please draw pediatric bottle. ~Blood Culture 1   Culture, Blood 2 [[id number]] Collected: 05/19/22 1451   Order Status: Completed Specimen: Blood Updated: 05/20/22 1717    Culture, Blood 2 No Growth to date.  Any change in status will be called. Narrative:     ORDER#: [id number]                          ORDERED BY: Donya Bergeron   SOURCE: Blood                              COLLECTED:  05/19/22 14:51   ANTIBIOTICS AT [facility]. :                      RECEIVED :  05/19/22 14:59   If child <=2 yrs old please draw pediatric bottle. ~Blood Culture #2   Culture with Smear, Acid Fast Bacillius [[id number]] Collected: 05/19/22 1744   Order Status: Sent Specimen: Body Fluid Updated: 05/20/22 1206    AFB Smear No AFB observed by Fluorescent stain   Narrative:     ORDER#: [id number]                          ORDERED BY: VJ BELLE   SOURCE: Fluid                              COLLECTED:  05/19/22 17:44   ANTIBIOTICS AT [facility]. :                      RECEIVED :  05/19/22 18:38   Culture, Fungus [[id number]] Collected: 05/19/22 1744   Order Status: Sent Specimen: Body Fluid Updated: 05/20/22 0501    Fungus Stain No Fungal elements seen   Narrative:     ORDER#: [id number]                          ORDERED BY: VJ BELLE   SOURCE: Fluid                              COLLECTED:  05/19/22 17:44   ANTIBIOTICS AT [facility]. :                      RECEIVED :  05/19/22 18:38 Culture, Wound [[id number]]    Order Status: Sent Specimen: Abscess    Culture, Blood 1 [[id number]]    Order Status: Sent Specimen: Blood    Culture, Blood 2 [[id number]]    Order Status: Sent Specimen: Blood    Culture, Body Fluid [[id number]] Collected: 05/19/22 1744   Order Status: Canceled Specimen: Body Fluid        Lab Results   Component Value Date    [facility]  05/19/2022     No Growth to date. Any change in status will be called. BLOODCULT2  05/19/2022     No Growth to date. Any change in status will be called. Respiratory Culture:  Lab Results   Component Value Date    LABGRAM  05/19/2022     2+ WBC's (Polymorphonuclear)  3+ Gram positive cocci  3+ Gram negative rods       AFB:  Lab Results   Component Value Date    AFBSMEAR No AFB observed by Fluorescent stain 05/19/2022     Viral Culture:  No results found for: COVID19  Urine Culture: No results for input(s): Caitlin Henderson in the last 72 hours. IMAGING:    CT PELVIS W CONTRAST Additional Contrast? None   Final Result   Complex right scrotal abscess with air content, measuring 9.4 cm. Reactive right inguinal adenopathy.       RECOMMENDATIONS:   Unavailable               All the pertinent images and reports for the current Hospitalization were reviewed by me     Scheduled Meds:   linezolid  600 mg IntraVENous Q12H    sodium chloride flush  5-40 mL IntraVENous 2 times per day    enoxaparin  30 mg SubCUTAneous BID    piperacillin-tazobactam  3,375 mg IntraVENous Q8H       Continuous Infusions:   sodium chloride         PRN Meds:  sodium chloride flush, sodium chloride, ondansetron **OR** ondansetron, polyethylene glycol, acetaminophen **OR** acetaminophen, oxyCODONE, morphine      Assessment:     Patient Active Problem List   Diagnosis    Scrotal abscess     Scrotal abscess  Large  Scrotal cellulitis  WBC elevation  CT abd/plevis with  Complex Rt scrotal abscess with gas  Rt inguinal adenopathy   Obesity BMI at 34  Poor dentition+

## 2022-05-21 NOTE — PROGRESS NOTES
Urology responded and would like the patient to follow up with the wound care clinic.  Electronically signed by Jaquelin Rice RN on 5/21/2022 at 4:14 PM

## 2022-05-21 NOTE — PROGRESS NOTES
Pt resting in bed. A&Ox4. No complaints overnight. Scrotal dressing in tact. Call light and bedside table within reach.

## 2022-05-21 NOTE — PROGRESS NOTES
Secure message sent to ID MD in regards to discharge. MD responded to say that he will be rounding.  Electronically signed by Hansel López RN on 5/21/2022 at 3:28 PM

## 2022-05-21 NOTE — PROGRESS NOTES
Pt A&O in the bed. Pt tolerating PO intake well. AM medications administered without complications. Pt has no complaints of pain at this time. Dressing to scrotum is clean, dry and intact. UAL in the room. Call light within reach. Able to make needs known. Fall precautions in place. Will monitor.  Electronically signed by Estrella Hall RN on 5/21/2022 at 8:18 AM

## 2022-05-21 NOTE — PLAN OF CARE
Problem: Discharge Planning  Goal: Discharge to home or other facility with appropriate resources  5/21/2022 0925 by Satnam Avila RN  Outcome: Progressing  Note: Pt will be discharged to appropriate level of care. Plan is to return home. SW and medical team are following.     5/20/2022 2217 by Gee Craven RN  Outcome: Progressing  Flowsheets (Taken 5/20/2022 2030)  Discharge to home or other facility with appropriate resources: Identify barriers to discharge with patient and caregiver     Problem: Pain  Goal: Verbalizes/displays adequate comfort level or baseline comfort level  5/21/2022 0925 by Satnam Avila RN  Outcome: Progressing  Note: Pt verbalizes adequate comfort level. Pain medications administered appropriately. Will monitor. 5/20/2022 2217 by Gee Craven RN  Outcome: Progressing  Flowsheets (Taken 5/20/2022 2217)  Verbalizes/displays adequate comfort level or baseline comfort level: Encourage patient to monitor pain and request assistance     Problem: ABCDS Injury Assessment  Goal: Absence of physical injury  Outcome: Progressing  Note: Pt is free of injury. No injury noted. Fall precautions in place. Call light within reach. Will monitor. Problem: Safety - Adult  Goal: Free from fall injury  Outcome: Progressing  Note: Fall risk assessment completed. Fall precautions in place. Call light within reach. Pt educated on calling for assistance. Walkway free of clutter. Will continue to monitor.

## 2022-05-21 NOTE — PROGRESS NOTES
Dressing changed per order. Pt tolerated well.  Electronically signed by Severiano Maxwell RN on 5/21/2022 at 6:32 PM

## 2022-05-21 NOTE — PLAN OF CARE
Problem: Discharge Planning  Goal: Discharge to home or other facility with appropriate resources  Outcome: Progressing  Flowsheets (Taken 5/20/2022 2030)  Discharge to home or other facility with appropriate resources: Identify barriers to discharge with patient and caregiver     Problem: Pain  Goal: Verbalizes/displays adequate comfort level or baseline comfort level  Outcome: Progressing  Flowsheets (Taken 5/20/2022 2217)  Verbalizes/displays adequate comfort level or baseline comfort level: Encourage patient to monitor pain and request assistance

## 2022-05-21 NOTE — PROGRESS NOTES
Patient has no c/o other than being anxious to be d/c'ed home to aloow hi to work. Per discussion with Dr Xi Kendrick, the plan was for wound vac placement Monday. Still awaiting change to PO antibx per ID recs.    I told the patient I am OK with him going home and having his wound vac placed as an outpatient (if this can be arranged) and if ID is OK with this as well from an antibx standpoint

## 2022-05-21 NOTE — PROGRESS NOTES
Secure message sent to Urology MD regarding the wound vac that needs to be completed as outpatient. Asking to see if it needs to be completed at the Urology office or elsewhere. Awaiting return call.  Electronically signed by Estrella Hall RN on 5/21/2022 at 3:35 PM

## 2022-05-21 NOTE — PROGRESS NOTES
ID MD cleared patient for discharge. Hospitalist notified.  Electronically signed by Andreia Kingston RN on 5/21/2022 at 3:29 PM

## 2022-05-23 LAB
ANAEROBIC CULTURE: ABNORMAL
BLOOD CULTURE, ROUTINE: NORMAL
CULTURE, BLOOD 2: NORMAL
GRAM STAIN RESULT: ABNORMAL
GRAM STAIN RESULT: ABNORMAL
WOUND/ABSCESS: ABNORMAL
WOUND/ABSCESS: ABNORMAL

## 2022-05-23 NOTE — PROGRESS NOTES
Physician Progress Note      Anna Menendez  MELA #:                  [id number]  :                       1990  ADMIT DATE:       2022 11:20 AM  DISCH DATE:        2022 6:36 PM  RESPONDING  PROVIDER #:        Pily Laughlin MD          QUERY TEXT:    Patient admitted with scrotal abscess. Documentation reflects Sepsis in ED   note. If possible, please document in the progress notes and discharge   summary if Sepsis was: The medical record reflects the following:  Risk Factors: scrotal abscess  Clinical Indicators: On admission hr , wbc 17.6, CT pelvis-Complex right   scrotal abscess with air content, measuring 9.4 cm. Reactive right inguinal   adenopathy. Per OP report-  A vertical incision was made over the draining   sinuses in the scrotum and immediately we entered the abscess cavity  with a large volume purulent drainage identified. This was suctioned out of   the cavity. The drainage was quite foul smelling. Documentation per ED   of- Septicemia  Treatment: I&D scrotal abscess, Urology consult, monitor labs,ns bolus,   tylenol, iv cleocin, iv zyvox, iv zosyn, iv vanc, ID consult    Thank you, Heaven Hernandes RN CDS CRCR CCDS  [email or web address]  Options provided:  -- Sepsis confirmed after study  -- Sepsis treated and resolved  -- Sepsis ruled out after study  -- Other - I will add my own diagnosis  -- Disagree - Not applicable / Not valid  -- Disagree - Clinically unable to determine / Unknown  -- Refer to Clinical Documentation Reviewer    PROVIDER RESPONSE TEXT:    Sepsis treated and resolved. Query created by:  Jeffery Hernandes on 2022 10:01 AM      Electronically signed by:  Pily Laughlin MD 2022 10:26 AM

## 2022-05-24 ENCOUNTER — HOSPITAL ENCOUNTER (OUTPATIENT)
Dept: WOUND CARE | Age: 32
Discharge: HOME | End: 2022-05-24
Payer: COMMERCIAL

## 2022-05-24 VITALS
DIASTOLIC BLOOD PRESSURE: 112 MMHG | WEIGHT: 249.34 LBS | RESPIRATION RATE: 20 BRPM | BODY MASS INDEX: 36.93 KG/M2 | TEMPERATURE: 97.7 F | HEIGHT: 69 IN | SYSTOLIC BLOOD PRESSURE: 180 MMHG | HEART RATE: 116 BPM

## 2022-05-24 DIAGNOSIS — N49.2 SCROTAL ABSCESS: Primary | ICD-10-CM

## 2022-05-24 DIAGNOSIS — S31.30XA OPEN WOUND OF SCROTUM WITH COMPLICATION, INITIAL ENCOUNTER: ICD-10-CM

## 2022-05-24 PROCEDURE — 11043 DBRDMT MUSC&/FSCA 1ST 20/<: CPT

## 2022-05-24 PROCEDURE — 11043 DBRDMT MUSC&/FSCA 1ST 20/<: CPT | Performed by: EMERGENCY MEDICINE

## 2022-05-24 PROCEDURE — 99213 OFFICE O/P EST LOW 20 MIN: CPT

## 2022-05-24 RX ORDER — GINSENG 100 MG
CAPSULE ORAL ONCE
Status: CANCELLED | OUTPATIENT
Start: 2022-05-24 | End: 2022-05-24

## 2022-05-24 RX ORDER — LIDOCAINE 40 MG/G
CREAM TOPICAL ONCE
Status: CANCELLED | OUTPATIENT
Start: 2022-05-24 | End: 2022-05-24

## 2022-05-24 RX ORDER — BETAMETHASONE DIPROPIONATE 0.05 %
OINTMENT (GRAM) TOPICAL ONCE
Status: CANCELLED | OUTPATIENT
Start: 2022-05-24 | End: 2022-05-24

## 2022-05-24 RX ORDER — SODIUM HYPOCHLORITE 1.25 MG/ML
SOLUTION TOPICAL 2 TIMES DAILY
Qty: 1000 ML | Refills: 1 | Status: SHIPPED | OUTPATIENT
Start: 2022-05-24

## 2022-05-24 RX ORDER — GENTAMICIN SULFATE 1 MG/G
OINTMENT TOPICAL ONCE
Status: CANCELLED | OUTPATIENT
Start: 2022-05-24 | End: 2022-05-24

## 2022-05-24 RX ORDER — CLOBETASOL PROPIONATE 0.5 MG/G
OINTMENT TOPICAL ONCE
Status: CANCELLED | OUTPATIENT
Start: 2022-05-24 | End: 2022-05-24

## 2022-05-24 RX ORDER — BACITRACIN, NEOMYCIN, POLYMYXIN B 400; 3.5; 5 [USP'U]/G; MG/G; [USP'U]/G
OINTMENT TOPICAL ONCE
Status: CANCELLED | OUTPATIENT
Start: 2022-05-24 | End: 2022-05-24

## 2022-05-24 RX ORDER — LIDOCAINE HYDROCHLORIDE 20 MG/ML
JELLY TOPICAL ONCE
Status: CANCELLED | OUTPATIENT
Start: 2022-05-24 | End: 2022-05-24

## 2022-05-24 RX ORDER — LIDOCAINE 40 MG/G
CREAM TOPICAL ONCE
Status: COMPLETED | OUTPATIENT
Start: 2022-05-24 | End: 2022-05-24

## 2022-05-24 RX ORDER — LIDOCAINE HYDROCHLORIDE 40 MG/ML
SOLUTION TOPICAL ONCE
Status: DISCONTINUED | OUTPATIENT
Start: 2022-05-24 | End: 2022-05-25 | Stop reason: HOSPADM

## 2022-05-24 RX ORDER — BACITRACIN ZINC AND POLYMYXIN B SULFATE 500; 1000 [USP'U]/G; [USP'U]/G
OINTMENT TOPICAL ONCE
Status: CANCELLED | OUTPATIENT
Start: 2022-05-24 | End: 2022-05-24

## 2022-05-24 RX ORDER — LIDOCAINE 50 MG/G
OINTMENT TOPICAL ONCE
Status: CANCELLED | OUTPATIENT
Start: 2022-05-24 | End: 2022-05-24

## 2022-05-24 RX ORDER — LIDOCAINE HYDROCHLORIDE 40 MG/ML
SOLUTION TOPICAL ONCE
Status: CANCELLED | OUTPATIENT
Start: 2022-05-24 | End: 2022-05-24

## 2022-05-24 RX ADMIN — LIDOCAINE: 40 CREAM TOPICAL at 11:47

## 2022-05-24 ASSESSMENT — PAIN SCALES - GENERAL
PAINLEVEL_OUTOF10: 0
PAINLEVEL_OUTOF10: 0

## 2022-05-24 NOTE — H&P
[facility]   History and Physical Note   Referring Provider: Dr. Obie Steele  Reason for Referral: scrotal wound    [address] [facility]:  [id number]  AGE: 27 y.o. GENDER: male  : 1990  EPISODE DATE:  2022    Chief complaint and reason for visit:     Chief Complaint   Patient presents with    Wound Check     New patient visit for a wound to the scrotum. HISTORY of PRESENT ILLNESS HPI     Lan James is a 27 y.o. male who presents today for an initial evaluation of a wound/ulcer. Patient is new to the wound center. Wound duration: since (date) 22. History of Wound Context: 26-year-old male who indicated that he has a history of a chronic scrotal cyst which ruptured during a volleyball game. He states the area drained on its own and has been doing pretty well until 2022 when he noticed worsening pain, erythema, edema. He presented to the hospital and was admitted with diagnosis of scrotal abscess. Patient was evaluated by urology. The patient underwent surgery by Dr. Obie Steele who performed an incision and drainage on 2022. He has remaining nonhealing right sided scrotal wound for which she has been referred to the wound center here at [facility] for wound care recommendations. No associated fever or chills. Does have significant anxiety. On oxycodone for pain control. Plans to file for FMLA. Patient had the same packing/dressing that he had when he left the hospital on 2022. Pertinent associated symptoms: pain severity: intermittent, mild, pain quality: sharp, drainage , redness and swelling    PAST MEDICAL HISTORY    History reviewed. No pertinent past medical history.     PAST SURGICAL HISTORY    Past Surgical History:   Procedure Laterality Date    SCROTAL SURGERY Right 2022    SCROTAL INCISION AND DRAINAGE - RIGHT performed by Eleazar Robles MD at [place] [address] FAMILY HISTORY    History reviewed. No pertinent family history. SOCIAL HISTORY    Social History     Tobacco Use    Smoking status: Current Every Day Smoker     Packs/day: 0.50    Smokeless tobacco: Never Used   Substance Use Topics    Alcohol use: Not Currently    Drug use: Never       ALLERGIES    No Known Allergies    MEDICATIONS    Current Outpatient Medications on File Prior to Encounter   Medication Sig Dispense Refill    oxyCODONE (ROXICODONE) 5 MG immediate release tablet Take 1 tablet by mouth every 8 hours as needed for Pain for up to 3 days. 9 tablet 0    doxycycline hyclate (VIBRA-TABS) 100 MG tablet Take 1 tablet by mouth 2 times daily for 10 days 20 tablet 0    amoxicillin-clavulanate (AUGMENTIN) 875-125 MG per tablet Take 1 tablet by mouth 2 times daily for 10 days 20 tablet 0    ibuprofen (ADVIL;MOTRIN) 200 MG tablet Take 400 mg by mouth every 6 hours as needed for Pain       No current facility-administered medications on file prior to encounter. REVIEW OF SYSTEMS  A comprehensive review of systems was negative except for: Pertinent items are noted in HPI. Objective:      BP (!) 180/112   Pulse 116   Temp 97.7 °F (36.5 °C) (Oral)   Resp 20   Ht 5' 9\" (1.753 m)   Wt 249 lb 5.4 oz (113.1 kg)   BMI 36.82 kg/m²     Wt Readings from Last 3 Encounters:   05/24/22 249 lb 5.4 oz (113.1 kg)   05/21/22 248 lb 10.9 oz (112.8 kg)       PHYSICAL EXAM  General Appearance/Constitutional: alert and oriented to person, place and time,  and in no acute distress. Nontoxic. Skin: warm and dry, no rash, positive wound per LDA documentation. Head: normocephalic and atraumatic. Eyes: extraocular eye movements intact, conjunctivae normal, and sclera anicteric. ENT: hearing grossly normal bilaterally. Normal appearance. Pulmonary/Chest: no chest wall tenderness and clear anteriorly. No respiratory distress. Cardiovascular: normal rate and regular rhythm.   GI: abdomen soft, non-tender and non-distended. Musculoskeletal: normal range of motion of joints. Nontender calves. No cyanosis. Nontender calves. Edema negative  Neurologic: no gross cranial nerve deficit and speech normal. No focal deficits. Mental status normal.      Medical Decision Makin-year-old male with surgical wound to his right scrotum status post incision and drainage for abscess on 2022 during recent hospitalization. Patient on oxycodone for pain control. No acute symptoms of any systemic complaints. Problem List Items Addressed This Visit        Genitourinary    Scrotal abscess - Primary    Open wound of scrotum with complication        Wound evaluation: Scrotal wound is deep with nonviable fibrous tissue along the edges and deep in the wound bed. Mild bleeding noted. Problems addressed during this encounter:  1. Right-sided scrotal wound, surgical.  Debridement done. Severity of muscle necrosis. Dakin's wet-to-dry twice daily. We also discussed the possibility of using negative pressure wound therapy. However the patient is very reluctant given the site of the wound. I explained to him that I will try to heal this wound without having to use his treatment modality. 2. Impaired mobility. Patient indicated he still wants to go to work but does plan to [employer]. Recommend either jockstrap or tight briefs to keep dressings in place. Discussed need to move carefully. Data reviewed and analyzed:  1. Assessment required other independent historian(s): No. Historian: patient . 2. Review of prior external note from other providers done today: Yes  3. New imaging or lab ordered today: No  4. Review of test results done today: Yes  5. Independent interpretation of test(s): No  6. Discussion of management or test interpretation with another provider and other qualified health care professional.    Risk of complications and/or mortality of patient management:  1.  This patient has a low risk of morbidity and mortality from additional diagnostic testing or treatment. This is due to the above conditions affecting wound healing as well as patient and procedure risk factors. Education and discussion held with patient regarding these disease processes pertinent to wound(s). 2. Other pertinent decisions include: minor surgery or procedures as below. 3. The patient's diagnosis or treatment is not significantly limited by social determinants of health as noted by: N/A .  4. Prescription drug management: dakins     Procedures done this visit:   Procedure Note  Indications:  Based on my examination of this patient's wound(s)/ulcer(s) today, debridement is required to promote healing and evaluate the wound base. Performed by: Horace Macias MD  Consent obtained:  Yes  Time out taken:  Yes  Pain Control: Anesthetic  Anesthetic: 4% Lidocaine Liquid Topical (10 ml)     Debridement: Excisional Debridement    Using curette the wound(s)/ulcer(s) was/were debrided down through and including the removal of muscle/fascia.         Devitalized Tissue Debrided:  fibrin, biofilm, slough, necrotic/eschar and exudate  Pre Debridement Measurements:  Are located in the [place]  Documentation Flow Sheet  Diabetic/Pressure/Non Pressure Ulcers only:  Ulcer: Non-Pressure ulcer, muscle necrosis   Wound/Ulcer #: 1  Post Debridement Measurements:  Wound/Ulcer Descriptions are Pre Debridement except measurements:    Wound 05/24/22 Scrotum #1(acquired on 5/19/22) (Active)   Wound Image   05/24/22 1037   Dressing Status Old drainage noted 05/24/22 1037   Wound Cleansed Cleansed with saline 05/24/22 1037   Wound Length (cm) 5.5 cm 05/24/22 1037   Wound Width (cm) 1.5 cm 05/24/22 1037   Wound Depth (cm) 4.3 cm 05/24/22 1037   Wound Surface Area (cm^2) 8.25 cm^2 05/24/22 1037   Wound Volume (cm^3) 35.475 cm^3 05/24/22 1037   Post-Procedure Length (cm) 5.5 cm 05/24/22 1059   Post-Procedure Width (cm) 1.5 cm 05/24/22 1059   Post-Procedure Depth (cm) 4.4 cm 05/24/22 1059   Post-Procedure Surface Area (cm^2) 8.25 cm^2 05/24/22 1059   Post-Procedure Volume (cm^3) 36.3 cm^3 05/24/22 1059   Wound Assessment Granulation tissue;Slough 05/24/22 1037   Drainage Amount Moderate 05/24/22 1037   Drainage Description Serosanguinous 05/24/22 1037   Odor None 05/24/22 1037   Angi-wound Assessment Edematous; Excoriated 05/24/22 1037   Margins Undefined edges 05/24/22 1037   Wound Thickness Description not for Pressure Injury Full thickness 05/24/22 1037   Number of days: 0       Total Surface Area Debrided:  8.25 sq cm   Estimated Blood Loss:  Minimal  Hemostasis Achieved:  by pressure  Procedural Pain:  0  / 10   Post Procedural Pain:  0 / 10   Response to treatment:  Well tolerated by patient. Written patient dismissal instructions given to patient and signed by patient or POA. Discharge [address] and Hyperbaric Oxygen Therapy   Physician Orders and Discharge Instructions  [address], [place] [address]  Telephone: [phone]    NAME:  Antoni Boswell  YOB: 1990  MEDICAL RECORD NUMBER:  [id number]  DATE:  5/24/2022    Wound Cleansing:   Do not scrub or use excessive force. Cleanse wound prior to applying a clean dressing with:  [] Normal Saline  [] Keep Wound Dry in Shower    [] Wound Cleanser   [] Cleanse wound with Mild Soap & Water  [] May Shower at Discharge   [] Other:     Topical Treatments:  Do not apply lotions, creams, or ointments to wound bed unless directed. [] Apply moisturizing lotion to skin surrounding the wound prior to dressing change.  [] Apply antifungal ointment to skin surrounding the wound prior to dressing change.  [] Apply thin film of moisture barrier ointment to skin immediately around wound.   [] Other:       Dressings:           Wound Location SCROTUM   [x] Apply Primary Dressing:       [] Other:    [x] Pack wound loosely with  [x] Other DAKINS DAMPENED KERLIX ROLL  [x] Cover and Secure with:     [x] ABD     [] Other:    Avoid contact of tape with skin. [x] Change dressing: [x] Daily/ TWICE DAILY IF POSSIBLE           Negative Pressure:           Wound Location:   [] Pressure@           mm/Hg  []Continuous []Intermittent   [] Black  [] White Foam [] Other:   []Change dressing: []Three times per week    []Other:     Pressure Relief:  [x] When sitting, shift position or do seat lifts every 15 minutes.  [] Wheelchair cushion [] Specialty Bed/Mattress  [] Turn every 2 hours when in bed. Avoid position directing pressure on wound site. Limit side lying to 30 degree tilt. Limit HOB elevation to 30 degrees. Dietary:  [x] Diet as tolerated:   [] Calorie Diabetic Diet:   [] No Added Salt:  [x] Increase Protein:   [] Other:     Activity:  [x] Activity as tolerated: If you are still having pain after you go home:  [x] Elevate the affected limb. [x] Use over-the-counter medications you would normally use for pain as permitted by your family doctor. [x] For persistent pain not relieved by the above interventions, please call your family doctor.              Return Appointment:  [] Wound and dressing supply provider:   [] ECF or Home Healthcare:  [] Wound Assessment: [] Physician or NP scheduled for Wound Assessment:   [x] Return Appointment: With DR. Adia Stauffer MD  in  1 Week(s)  [] Ordered tests:     Nurse Case Manger:  NAVEEN   Electronically signed by Samra Lopez RN on 5/24/2022 at [address] [place] [address] : Should you experience any significant changes in your wound(s) or have questions about your wound care, please contact the [address] [place] [address] at [address] 8:00 am - 4:30 pm and Friday 8:00 am - 12:30 pm.  If you need help with your wound outside these hours and cannot wait until we are again available, contact your PCP or go to the hospital emergency room.     PLEASE NOTE: IF YOU ARE UNABLE TO OBTAIN WOUND SUPPLIES, CONTINUE TO USE THE SUPPLIES YOU HAVE AVAILABLE UNTIL YOU ARE ABLE TO REACH US. IT IS MOST IMPORTANT TO KEEP THE WOUND COVERED AT ALL TIMES. Physician Signature:_______________________    Date: ___________ Time:  ____________        DR. Michelle Soto MD                         Electronically signed by Eliana Hewitt MD on 5/24/2022 at 11:24 AM

## 2022-05-24 NOTE — PLAN OF CARE
Description not for Pressure Injury Full thickness 05/24/22 1037   Number of days: 0          Supplies Requested :      WOUND #: 1   PRIMARY DRESSING:  Pharmaceutical medication Dakins   Cover and Secure with: ABD pad  Bulky roll gauze     FREQUENCY OF DRESSING CHANGES:  Other Twice a Day           ADDITIONAL ITEMS:  [] Gloves Small  [] Gloves Medium [] Gloves Large [x] Gloves XLarge  [] Tape 1\" [] Tape 2\" [] Tape 3\"  [] Medipore Tape  [] Saline  [] Skin Prep   [] Adhesive Remover   [x] Cotton Tip Applicators   [] Other:    Patient Wound(s) Debrided: [x] Yes   [] No    Debribement Type: muscle/fascia    Debridement Date: 5/24/22    Patient currently being seen by Home Health: [] Yes   [x] No    Duration for needed supplies:  []15  []30  []60  [x]90 Days    Provider Information:      PROVIDER'S NAME: Lunda Dakin, MD     NPI: [id number]

## 2022-05-24 NOTE — LETTER
[address]  [address] OFF[address] [place] [address] [address]  [phone]  Dept: [phone]   TODAYS DATE: 5/23/2022    [address] [place] [address],[address] Floor Wound Care   Appointment Treatment Guidelines  Welcome Mr. Carmina Badillobjornakash to the [address] [place] [address]. We appreciate the confidence you have shown in choosing us as your wound care provider. Our goal is to heal your wound(s) as quickly as possible. Please read the items below regarding the nature of your appointments. 1. We will make every effort to schedule appointments that are convenient for you. Certain days and times may not be available, depending on your providers office hours and details of your care. 2. Patients will not necessarily be brought to an exam room in the order in which they arrive. Many providers work out of this office and patients are here for different procedures. Our goal is to serve you as quickly as possible. 3. We acknowledge that your time is valuable. Please remember that wound healing takes time and we appreciate your understanding that the length of each patients appointment will vary depending upon their need. 4. It is for your protection that we ask for insurance cards, photo ID, and new consent forms on your first visit and periodically throughout your treatment at all our facilities. 5. Wound Care treatment is known to be most effective when provided on a regular basis. Missed appointments, and not following the recommended plan of care can result in ineffective treatment and a poor outcome. If you find it difficult to keep appointments or to follow the recommended plan of care, it is your responsibility to let the staff know, so that we can work with you toward a solution. 6. If you need to miss an appointment, please call to let us know. We expect 24 hours notice for all cancellations.  We also expect missed visits to be rescheduled as soon as possible, preferably within the same week to promote the most effective healing time for your wound(s). 7. If you will be late for an appointment, please call our center to be sure that the provider can still see you when you arrive. If you are more than 15 minutes late your appointment may need to be rescheduled. 8. If two (2) appointments are missed without notifying us, your care plan may be discontinued. The same may happen if multiple visits are cancelled or rescheduled, even with notice. A missed visit is time when another patient, who also needs care, could have been seen. Thank you for your understanding and consideration.

## 2022-05-24 NOTE — DISCHARGE SUMMARY
Hospital Medicine Discharge Summary    Patient ID: Elizabeth Senior      Patient's PCP: No primary care provider on file. Admit Date: 5/19/2022     Discharge Date: 5/21/2022      Admitting Physician: Nikhil Mcginnis MD     Discharge Physician: Arabella Delacruz MD     Discharge Diagnoses: Active Hospital Problems    Diagnosis Date Noted    Scrotal abscess [N49.2] 05/19/2022     Priority: Medium       The patient was seen and examined on day of discharge and this discharge summary is in conjunction with any daily progress note from day of discharge. Hospital Course:       Right scrotal abscess status post I&D  - Urology following  - Hgb A1c 5.3  - Continue IV vancomycin, Zosyn  - Infectious disease consult  - Continue pain control  -- needs Wound vac placement. Patient discussed with urology and outpatient wound vac will be done. - ok for discharge      Exam:     /82   Pulse 74   Temp 97.9 °F (36.6 °C) (Oral)   Resp 20   Ht 5' 9\" (1.753 m)   Wt 248 lb 10.9 oz (112.8 kg)   SpO2 96%   BMI 36.72 kg/m²     General appearance: No apparent distress, appears stated age and cooperative. HEENT: Pupils equal, round, and reactive to light. Conjunctivae/corneas clear. Neck: Supple, with full range of motion. No jugular venous distention. Trachea midline. Respiratory:  Normal respiratory effort. Clear to auscultation, bilaterally without Rales/Wheezes/Rhonchi. Cardiovascular: Regular rate and rhythm with normal S1/S2 without murmurs, rubs or gallops. Abdomen: Soft, non-tender, non-distended with normal bowel sounds. Musculoskelatal: No clubbing, cyanosis or edema bilaterally. Full range of motion without deformity. Skin: Skin color, texture, turgor normal.  Right scrotal swelling with incision and packing in place  Neurologic:  Neurovascularly intact without any focal sensory/motor deficits.  Cranial nerves: II-XII intact, grossly non-focal.  Psychiatric: Alert and oriented, thought content appropriate, normal insight      Consults:     IP CONSULT TO UROLOGY  IP CONSULT TO INFECTIOUS DISEASES    Significant Diagnostic Studies:          Radiology:  CT PELVIS W CONTRAST Additional Contrast? None   Final Result   Complex right scrotal abscess with air content, measuring 9.4 cm.       Reactive right inguinal adenopathy.       RECOMMENDATIONS:   Unavailable             PCP/SNF to follow up: F/u with Wound care/Urology    Disposition:  Home    Condition on D/C:  Stable     Discharge Instructions/Follow-up:  Wound vac to be placed in a few days    Code Status:  Prior     Activity: activity as tolerated    Diet: regular diet    Labs: For convenience and continuity at follow-up the following most recent labs are provided:      CBC:    Lab Results   Component Value Date    WBC 15.5 05/20/2022    HGB 13.6 05/20/2022    HCT 39.6 05/20/2022     05/20/2022       Renal:    Lab Results   Component Value Date     05/20/2022    K 4.7 05/20/2022     05/20/2022    CO2 23 05/20/2022    BUN 9 05/20/2022    CREATININE 1.1 05/20/2022    CALCIUM 9.2 05/20/2022       Discharge Medications:     Discharge Medication List as of 5/21/2022  5:14 PM           Details   oxyCODONE (ROXICODONE) 5 MG immediate release tablet Take 1 tablet by mouth every 8 hours as needed for Pain for up to 3 days. , Disp-9 tablet, R-0Print      doxycycline hyclate (VIBRA-TABS) 100 MG tablet Take 1 tablet by mouth 2 times daily for 10 days, Disp-20 tablet, R-0Normal      amoxicillin-clavulanate (AUGMENTIN) 875-125 MG per tablet Take 1 tablet by mouth 2 times daily for 10 days, Disp-20 tablet, R-0Normal              Details   ibuprofen (ADVIL;MOTRIN) 200 MG tablet Take 400 mg by mouth every 6 hours as needed for PainHistorical Med             Time Spent on discharge is more than 45 minutes in the examination, evaluation, counseling and review of medications and discharge plan. Signed:     Ashanti Banks MD   5/24/2022      Thank you No primary care provider on file. for the opportunity to be involved in this patient's care. If you have any questions or concerns please feel free to contact me at [phone].

## 2022-05-24 NOTE — LETTER
[address]  [address] OFF[address] [place] [address] [facility] [address]  [phone]  Dept: [phone]        Thank you Mr. Mcdermtot Friend for choosing [facility] for your Wound Care needs. We hope you found your first visit both encouraging and educational.  We look forward to serving you throughout the healing process. Before your next visit please review the information you received in your [employer]. The first visit can be overwhelming and this is a useful tool to refresh what information you may have been given. If you find yourself with any questions prior to your upcoming appointment, please feel free to contact us. Often wounds can be challenging and it is our goal to see you through the healing process with as much support possible. Again, thank you for choosing [facility]!     Sincerely,      The Staff of [address] [place] [address] Pkwy and Hyperbaric Oxygen Therapy

## 2022-05-26 ENCOUNTER — TELEPHONE (OUTPATIENT)
Dept: WOUND CARE | Age: 32
End: 2022-05-26

## 2022-05-26 DIAGNOSIS — S31.30XD: ICD-10-CM

## 2022-05-26 DIAGNOSIS — N49.2 SCROTAL ABSCESS: ICD-10-CM

## 2022-05-26 NOTE — TELEPHONE ENCOUNTER
Patient called on 5/26/22 with concerns regarding care and treatment of his scrotal wound. Patient states he is starting to experience dry skin and \"chaffing\" on his thighs and to his karli-wound, patient feels this may be due to rubbing from ABD pad. Patient also noted some \"black dry tissue\" around the uppermost outer edge of his wound, he is unsure if this was present during his last visit to the [address] [place] [address] and he wanted to notify his provider. Spoke with Dr. Areli Hearn, who ordered an over the counter Zinc based cream such as Triple Paste for the excoriated areas and patient may try an unscented feminine hygiene or plain gauze as an alternative to an ABD pad. Patient verbalized understanding of new instructions, no additional questions at this time. Per Dr. Areli Hearn, the area of \"black dry tissue\" was noted during his last visit and the MD is working to slowly remove this area and is not a concerned at this time. Staff reviewed with patient signs and symptoms of infection such as increased redness, swelling, pain, fever, odor or perulent drainage as indicators that should encourage the patient to seek further medical attention in the ED. Patient again verbalized understanding, denies any new odor or change in drainage at this time. No other changes in treatment were made at this time. Patient denies any further questions or concerns. Follow up with Dr. Areli Hearn MD on 5/31/22.   Electronically signed by Frank Valdovinos RN on 5/26/2022 at 5:08 PM

## 2022-05-31 ENCOUNTER — HOSPITAL ENCOUNTER (OUTPATIENT)
Dept: WOUND CARE | Age: 32
Discharge: HOME | End: 2022-05-31
Payer: COMMERCIAL

## 2022-05-31 VITALS
SYSTOLIC BLOOD PRESSURE: 156 MMHG | RESPIRATION RATE: 18 BRPM | HEART RATE: 96 BPM | TEMPERATURE: 98.1 F | DIASTOLIC BLOOD PRESSURE: 103 MMHG

## 2022-05-31 DIAGNOSIS — S31.30XA OPEN WOUND OF SCROTUM WITH COMPLICATION, INITIAL ENCOUNTER: Primary | ICD-10-CM

## 2022-05-31 DIAGNOSIS — N49.2 SCROTAL ABSCESS: ICD-10-CM

## 2022-05-31 PROCEDURE — 11042 DBRDMT SUBQ TIS 1ST 20SQCM/<: CPT

## 2022-05-31 PROCEDURE — 11042 DBRDMT SUBQ TIS 1ST 20SQCM/<: CPT | Performed by: EMERGENCY MEDICINE

## 2022-05-31 RX ORDER — GINSENG 100 MG
CAPSULE ORAL ONCE
Status: CANCELLED | OUTPATIENT
Start: 2022-05-31 | End: 2022-05-31

## 2022-05-31 RX ORDER — BACITRACIN ZINC AND POLYMYXIN B SULFATE 500; 1000 [USP'U]/G; [USP'U]/G
OINTMENT TOPICAL ONCE
Status: CANCELLED | OUTPATIENT
Start: 2022-05-31 | End: 2022-05-31

## 2022-05-31 RX ORDER — BACITRACIN, NEOMYCIN, POLYMYXIN B 400; 3.5; 5 [USP'U]/G; MG/G; [USP'U]/G
OINTMENT TOPICAL ONCE
Status: CANCELLED | OUTPATIENT
Start: 2022-05-31 | End: 2022-05-31

## 2022-05-31 RX ORDER — LIDOCAINE HYDROCHLORIDE 40 MG/ML
SOLUTION TOPICAL ONCE
Status: CANCELLED | OUTPATIENT
Start: 2022-05-31 | End: 2022-05-31

## 2022-05-31 RX ORDER — BETAMETHASONE DIPROPIONATE 0.05 %
OINTMENT (GRAM) TOPICAL ONCE
Status: CANCELLED | OUTPATIENT
Start: 2022-05-31 | End: 2022-05-31

## 2022-05-31 RX ORDER — LIDOCAINE 50 MG/G
OINTMENT TOPICAL ONCE
Status: CANCELLED | OUTPATIENT
Start: 2022-05-31 | End: 2022-05-31

## 2022-05-31 RX ORDER — LIDOCAINE 40 MG/G
CREAM TOPICAL ONCE
Status: CANCELLED | OUTPATIENT
Start: 2022-05-31 | End: 2022-05-31

## 2022-05-31 RX ORDER — LIDOCAINE HYDROCHLORIDE 20 MG/ML
JELLY TOPICAL ONCE
Status: CANCELLED | OUTPATIENT
Start: 2022-05-31 | End: 2022-05-31

## 2022-05-31 RX ORDER — GENTAMICIN SULFATE 1 MG/G
OINTMENT TOPICAL ONCE
Status: CANCELLED | OUTPATIENT
Start: 2022-05-31 | End: 2022-05-31

## 2022-05-31 RX ORDER — FLUCONAZOLE 150 MG/1
150 TABLET ORAL ONCE
Qty: 1 TABLET | Refills: 0 | Status: SHIPPED | OUTPATIENT
Start: 2022-05-31 | End: 2022-05-31

## 2022-05-31 RX ORDER — LIDOCAINE HYDROCHLORIDE 40 MG/ML
SOLUTION TOPICAL ONCE
Status: COMPLETED | OUTPATIENT
Start: 2022-05-31 | End: 2022-05-31

## 2022-05-31 RX ORDER — FLUCONAZOLE 100 MG/1
100 TABLET ORAL DAILY
Qty: 7 TABLET | Refills: 0 | Status: SHIPPED | OUTPATIENT
Start: 2022-05-31 | End: 2022-06-07

## 2022-05-31 RX ORDER — CLOBETASOL PROPIONATE 0.5 MG/G
OINTMENT TOPICAL ONCE
Status: CANCELLED | OUTPATIENT
Start: 2022-05-31 | End: 2022-05-31

## 2022-05-31 RX ADMIN — LIDOCAINE HYDROCHLORIDE: 40 SOLUTION TOPICAL at 09:58

## 2022-05-31 ASSESSMENT — PAIN SCALES - GENERAL
PAINLEVEL_OUTOF10: 0
PAINLEVEL_OUTOF10: 0

## 2022-05-31 NOTE — PROGRESS NOTES
[address] [facility]   Medical Staff Progress Note     [address] [facility] RECORD NUMBER:  [id number]  AGE: 27 y.o. GENDER: male  : 1990  EPISODE DATE:  2022    Chief complaint and reason for visit:     Chief Complaint   Patient presents with    Wound Check     Follow up for scrotal wound. Patient presenting for follow up evaluation of wound(s) per chief complaint. Subjective: Symptoms, wound related issues, or other pertinent wound history since last visit: had issues with abd pad rubbing. So using a different kind of pad    Medical Decision Making:     Problem List Items Addressed This Visit        Genitourinary    Scrotal abscess    Relevant Orders    Initiate Outpatient Wound Care Protocol    Open wound of scrotum with complication - Primary    Relevant Orders    Initiate Outpatient Wound Care Protocol          Wounds and Treatment Plan:   Right-sided scrotal wound, surgical.  Severity of muscle necrosis. Wound is slowly healing, getting smaller. Nonviable tissue. epithelizing too early along walls of wound. Debridement. Collagen, moist gauze. Other associated diagnoses or problems addressed:   Impaired mobility.  new problem today: cutaneous candidiasis. Zinc oxide. Diflucan     Pertinent imaging reviewed including independent interpretation include:   none    Pertinent labs reviewed. Medical records and review of external note (s) from other providers done as well. New lab or imaging orders placed:   none    Prescription drug management: dakins and diflucan     Discussion of management or test interpretation with another provider, other qualified health care professional and other external source. Comorbid conditions affecting wound healing: As per [facility] which was reviewed. Risk of complications and/or mortality of patient management:  1.  This patient has a moderate risk of morbidity and mortality from additional diagnostic testing or treatment. This is due to the above conditions affecting wound healing as well as patient and procedure risk factors. Education and discussion held with patient regarding these disease processes pertinent to wound(s). 2. Other pertinent decisions include: minor surgery or procedures as below. 3. The patient's diagnosis or treatment is not significantly limited by social determinants of health as noted by: N/A . Wound 05/24/22 Scrotum #1(acquired on 5/19/22) (Active)   Wound Image   05/24/22 1037   Dressing Status New dressing applied 05/24/22 1120   Wound Cleansed Cleansed with saline 05/24/22 1037   Dressing/Treatment Other (comment) 05/24/22 1120   Wound Length (cm) 4.9 cm 05/31/22 0955   Wound Width (cm) 2.3 cm 05/31/22 0955   Wound Depth (cm) 2 cm 05/31/22 0955   Wound Surface Area (cm^2) 11.27 cm^2 05/31/22 0955   Change in Wound Size % (l*w) -36.61 05/31/22 0955   Wound Volume (cm^3) 22.54 cm^3 05/31/22 0955   Wound Healing % 36 05/31/22 0955   Post-Procedure Length (cm) 4.9 cm 05/31/22 1004   Post-Procedure Width (cm) 2.3 cm 05/31/22 1004   Post-Procedure Depth (cm) 2.1 cm 05/31/22 1004   Post-Procedure Surface Area (cm^2) 11.27 cm^2 05/31/22 1004   Post-Procedure Volume (cm^3) 23.667 cm^3 05/31/22 1004   Wound Assessment Granulation tissue;Slough 05/31/22 0955   Drainage Amount Moderate 05/31/22 0955   Drainage Description Serosanguinous 05/31/22 0955   Odor None 05/31/22 0955   Angi-wound Assessment Edematous; Excoriated 05/31/22 0955   Margins Undefined edges 05/31/22 0955   Wound Thickness Description not for Pressure Injury Full thickness 05/31/22 0955   Number of days: 6       Procedures done during this encounter:   Debridement: Excisional Debridement  Indications:  Based on my examination of this patient's wound(s)/ulcer(s) today, debridement is required to promote healing and evaluate the wound base.  Risks and benefits discussed with patient who has agreed to proceed. Performed by: Sagar Mcodnnell MD  Consent obtained:  Yes  Time out taken:  Yes  Pain Control: Anesthetic  Anesthetic: 4% Lidocaine Liquid Topical   Using curette the wound(s)/ulcer(s) was/were debrided down through and including the removal of subcutaneous tissue. Devitalized Tissue Debrided:  fibrin, biofilm, slough and exudate  Pre Debridement Measurements:  Are located in the Wound/Ulcer Documentation Flow Sheet  Wound/Ulcer #: 1  Post Debridement Measurements:  Wound/Ulcer Descriptions are Pre Debridement except measurements: Total Surface Area Debrided:  11.27 sq cm   Diabetic/Pressure/Non Pressure Ulcers only:  Ulcer: Non-Pressure ulcer, muscle necrosis   Estimated Blood Loss:  Minimal  Hemostasis Achieved:  by pressure  Procedural Pain:  1  / 10   Post Procedural Pain:  0 / 10   Response to treatment:  Well tolerated by patient. HISTORY of PRESENT ILLNESS HPI     Kushal Burt is a 27 y.o. male who presents today for wound/ulcer evaluation. History of Wound Context: Per original history and physical on this patient. Changes in history since last exam:  New rash to groin bilaterally    Objective:    BP (!) 156/103   Pulse 96   Temp 98.1 °F (36.7 °C) (Infrared)   Resp 18   Wt Readings from Last 3 Encounters:   05/24/22 249 lb 5.4 oz (113.1 kg)   05/21/22 248 lb 10.9 oz (112.8 kg)       PHYSICAL EXAM  General: Alert and in no acute distress. Normal appearing  Skin: Warm and dry, no rash  Head: Normocephalic and atraumatic  Eyes: Extraocular eye movements intact, conjunctivae normal, and sclera anicteric  ENT: Hearing grossly normal bilaterally. Normal appearance  Respiratory: no chest wall tenderness. no respiratory distress  GI: Abdomen non-tender and benign  Musculoskeletal: Baseline range of motion in joints. Nontender calves. No cyanosis. Edema trace. Neurologic: Speech normal. At baseline without new focal deficits.  Mental status normal or at baseline    PAST MEDICAL HISTORY    History reviewed. No pertinent past medical history. PAST SURGICAL HISTORY    Past Surgical History:   Procedure Laterality Date    SCROTAL SURGERY Right 5/19/2022    SCROTAL INCISION AND DRAINAGE - RIGHT performed by Ayala Barroso MD at [address] [place] [address]    History reviewed. No pertinent family history. SOCIAL HISTORY    Social History     Tobacco Use    Smoking status: Current Every Day Smoker     Packs/day: 0.50    Smokeless tobacco: Never Used   Substance Use Topics    Alcohol use: Not Currently    Drug use: Never       ALLERGIES    No Known Allergies    MEDICATIONS    Current Outpatient Medications on File Prior to Encounter   Medication Sig Dispense Refill    sodium hypochlorite (DAKINS) 0.125 % SOLN external solution Apply topically 2 times daily 1000 mL 1    doxycycline hyclate (VIBRA-TABS) 100 MG tablet Take 1 tablet by mouth 2 times daily for 10 days 20 tablet 0    amoxicillin-clavulanate (AUGMENTIN) 875-125 MG per tablet Take 1 tablet by mouth 2 times daily for 10 days 20 tablet 0    ibuprofen (ADVIL;MOTRIN) 200 MG tablet Take 400 mg by mouth every 6 hours as needed for Pain       No current facility-administered medications on file prior to encounter. REVIEW OF SYSTEMS  A comprehensive review of systems was negative except for what has been indicated above and: none    Written patient dismissal instructions given to patient and signed by patient or POA.          Electronically signed by Courtney Boone MD on 5/31/2022 at 10:16 AM

## 2022-06-07 ENCOUNTER — HOSPITAL ENCOUNTER (OUTPATIENT)
Dept: WOUND CARE | Age: 32
Discharge: HOME | End: 2022-06-07
Payer: COMMERCIAL

## 2022-06-07 VITALS
TEMPERATURE: 97.8 F | RESPIRATION RATE: 18 BRPM | HEART RATE: 113 BPM | DIASTOLIC BLOOD PRESSURE: 99 MMHG | SYSTOLIC BLOOD PRESSURE: 179 MMHG

## 2022-06-07 DIAGNOSIS — N49.2 SCROTAL ABSCESS: ICD-10-CM

## 2022-06-07 DIAGNOSIS — S31.30XA OPEN WOUND OF SCROTUM WITH COMPLICATION, INITIAL ENCOUNTER: Primary | ICD-10-CM

## 2022-06-07 PROCEDURE — 11042 DBRDMT SUBQ TIS 1ST 20SQCM/<: CPT | Performed by: EMERGENCY MEDICINE

## 2022-06-07 PROCEDURE — 11042 DBRDMT SUBQ TIS 1ST 20SQCM/<: CPT

## 2022-06-07 RX ORDER — GINSENG 100 MG
CAPSULE ORAL ONCE
Status: CANCELLED | OUTPATIENT
Start: 2022-06-07 | End: 2022-06-07

## 2022-06-07 RX ORDER — LIDOCAINE HYDROCHLORIDE 40 MG/ML
SOLUTION TOPICAL ONCE
Status: CANCELLED | OUTPATIENT
Start: 2022-06-07 | End: 2022-06-07

## 2022-06-07 RX ORDER — GENTAMICIN SULFATE 1 MG/G
OINTMENT TOPICAL ONCE
Status: CANCELLED | OUTPATIENT
Start: 2022-06-07 | End: 2022-06-07

## 2022-06-07 RX ORDER — BACITRACIN ZINC AND POLYMYXIN B SULFATE 500; 1000 [USP'U]/G; [USP'U]/G
OINTMENT TOPICAL ONCE
Status: CANCELLED | OUTPATIENT
Start: 2022-06-07 | End: 2022-06-07

## 2022-06-07 RX ORDER — LIDOCAINE 50 MG/G
OINTMENT TOPICAL ONCE
Status: CANCELLED | OUTPATIENT
Start: 2022-06-07 | End: 2022-06-07

## 2022-06-07 RX ORDER — BACITRACIN, NEOMYCIN, POLYMYXIN B 400; 3.5; 5 [USP'U]/G; MG/G; [USP'U]/G
OINTMENT TOPICAL ONCE
Status: CANCELLED | OUTPATIENT
Start: 2022-06-07 | End: 2022-06-07

## 2022-06-07 RX ORDER — LIDOCAINE HYDROCHLORIDE 20 MG/ML
JELLY TOPICAL ONCE
Status: CANCELLED | OUTPATIENT
Start: 2022-06-07 | End: 2022-06-07

## 2022-06-07 RX ORDER — LIDOCAINE HYDROCHLORIDE 40 MG/ML
SOLUTION TOPICAL ONCE
Status: COMPLETED | OUTPATIENT
Start: 2022-06-07 | End: 2022-06-07

## 2022-06-07 RX ORDER — LIDOCAINE 40 MG/G
CREAM TOPICAL ONCE
Status: CANCELLED | OUTPATIENT
Start: 2022-06-07 | End: 2022-06-07

## 2022-06-07 RX ORDER — BETAMETHASONE DIPROPIONATE 0.05 %
OINTMENT (GRAM) TOPICAL ONCE
Status: CANCELLED | OUTPATIENT
Start: 2022-06-07 | End: 2022-06-07

## 2022-06-07 RX ORDER — CLOBETASOL PROPIONATE 0.5 MG/G
OINTMENT TOPICAL ONCE
Status: CANCELLED | OUTPATIENT
Start: 2022-06-07 | End: 2022-06-07

## 2022-06-07 RX ADMIN — LIDOCAINE HYDROCHLORIDE: 40 SOLUTION TOPICAL at 09:07

## 2022-06-07 ASSESSMENT — PAIN SCALES - GENERAL: PAINLEVEL_OUTOF10: 0

## 2022-06-07 NOTE — PROGRESS NOTES
[address] [facility]   Medical Staff Progress Note     100 [facility] RECORD NUMBER:  [id number]  AGE: 27 y.o. GENDER: male  : 1990  EPISODE DATE:  2022    Chief complaint and reason for visit:     Chief Complaint   Patient presents with    Wound Check     Follow up for scrotal wound. Patient presenting for follow up evaluation of wound(s) per chief complaint. Subjective: Symptoms, wound related issues, or other pertinent wound history since last visit: had issues with abd pad rubbing. So using a different kind of pad    Medical Decision Making:     Problem List Items Addressed This Visit        Genitourinary    Scrotal abscess    Relevant Orders    Initiate Outpatient Wound Care Protocol    Open wound of scrotum with complication - Primary    Relevant Orders    Initiate Outpatient Wound Care Protocol          Wounds and Treatment Plan:   Right-sided scrotal wound, surgical.  Severity of muscle necrosis. Wound is slowly healing, getting smaller. Nonviable tissue. epithelizing too early along walls of wound. Debridement. Patient really needs to clean this area much better than he is doing. Had a lot of matted pubic hair noted. Gave him specific instructions on how to clean the wound very well. Patient is supposed to get a lot of the cleansing material into the wound and wiped out with moistened Dakin's gauze. He indicated he would put more effort into this. Collagen, moist gauze. Other associated diagnoses or problems addressed:   Impaired mobility.  Cutaneous candidiasis. Significantly improved. Nearly all resolved. Continue Desitin zinc oxide. Diflucan     Pertinent imaging reviewed including independent interpretation include:   none    Pertinent labs reviewed. Medical records and review of external note (s) from other providers done as well.     New lab or imaging orders placed:   none    Prescription drug management: dakins and diflucan     Discussion of management or test interpretation with another provider, other qualified health care professional and other external source. Comorbid conditions affecting wound healing: As per [facility] which was reviewed. Risk of complications and/or mortality of patient management:  1. This patient has a moderate risk of morbidity and mortality from additional diagnostic testing or treatment. This is due to the above conditions affecting wound healing as well as patient and procedure risk factors. Education and discussion held with patient regarding these disease processes pertinent to wound(s). 2. Other pertinent decisions include: minor surgery or procedures as below. 3. The patient's diagnosis or treatment is not significantly limited by social determinants of health as noted by: N/A .     Wound 05/24/22 Scrotum #1(acquired on 5/19/22) (Active)   Wound Image   06/07/22 0903   Dressing Status New dressing applied 05/24/22 1120   Wound Cleansed Cleansed with saline 05/24/22 1037   Dressing/Treatment Collagen with Ag;Moisten with saline;Gauze dressing/dressing sponge 05/31/22 1029   Wound Length (cm) 4 cm 06/07/22 0903   Wound Width (cm) 2 cm 06/07/22 0903   Wound Depth (cm) 2.9 cm 06/07/22 0903   Wound Surface Area (cm^2) 8 cm^2 06/07/22 0903   Change in Wound Size % (l*w) 3.03 06/07/22 0903   Wound Volume (cm^3) 23.2 cm^3 06/07/22 0903   Wound Healing % 35 06/07/22 0903   Post-Procedure Length (cm) 4.9 cm 05/31/22 1004   Post-Procedure Width (cm) 2.3 cm 05/31/22 1004   Post-Procedure Depth (cm) 2.1 cm 05/31/22 1004   Post-Procedure Surface Area (cm^2) 11.27 cm^2 05/31/22 1004   Post-Procedure Volume (cm^3) 23.667 cm^3 05/31/22 1004   Wound Assessment Granulation tissue;Slough 06/07/22 0903   Drainage Amount Small 06/07/22 0903   Drainage Description Serosanguinous 06/07/22 0903   Odor None 06/07/22 0903   Angi-wound Assessment Edematous;Dry/flaky 06/07/22 0903   Margins Undefined edges;Epibole (rolled edges) 06/07/22 0903   Wound Thickness Description not for Pressure Injury Full thickness 06/07/22 0073   Number of days: 13       Procedures done during this encounter:   Debridement: Excisional Debridement  Indications:  Based on my examination of this patient's wound(s)/ulcer(s) today, debridement is required to promote healing and evaluate the wound base. Risks and benefits discussed with patient who has agreed to proceed. Performed by: Balbina Irene MD  Consent obtained:  Yes  Time out taken:  Yes  Pain Control: Anesthetic  Anesthetic: 4% Lidocaine Liquid Topical   Using curette the wound(s)/ulcer(s) was/were debrided down through and including the removal of subcutaneous tissue. Devitalized Tissue Debrided:  fibrin, biofilm, slough and exudate  Pre Debridement Measurements:  Are located in the Wound/Ulcer Documentation Flow Sheet  Wound/Ulcer #: 1  Post Debridement Measurements:  Wound/Ulcer Descriptions are Pre Debridement except measurements: Total Surface Area Debrided: 8 sq cm   Diabetic/Pressure/Non Pressure Ulcers only:  Ulcer: Non-Pressure ulcer, muscle necrosis   Estimated Blood Loss:  Minimal  Hemostasis Achieved:  by pressure  Procedural Pain:  1  / 10   Post Procedural Pain:  0 / 10   Response to treatment:  Well tolerated by patient. HISTORY of PRESENT ILLNESS HPI     Que Valdez is a 27 y.o. male who presents today for wound/ulcer evaluation. History of Wound Context: Per original history and physical on this patient. Changes in history since last exam:  New rash to groin bilaterally    Objective:    BP (!) 179/99 Comment: patient stated it is related to anxiety  Pulse (!) 113   Temp 97.8 °F (36.6 °C) (Infrared)   Resp 18   Wt Readings from Last 3 Encounters:   05/24/22 249 lb 5.4 oz (113.1 kg)   05/21/22 248 lb 10.9 oz (112.8 kg)       PHYSICAL EXAM  General: Alert and in no acute distress.  Normal appearing  Skin: Warm and dry, no rash  Head: Normocephalic and atraumatic  Eyes: Extraocular eye movements intact, conjunctivae normal, and sclera anicteric  ENT: Hearing grossly normal bilaterally. Normal appearance  Respiratory: no chest wall tenderness. no respiratory distress  GI: Abdomen non-tender and benign  Musculoskeletal: Baseline range of motion in joints. Nontender calves. No cyanosis. Edema trace. Neurologic: Speech normal. At baseline without new focal deficits. Mental status normal or at baseline    PAST MEDICAL HISTORY    History reviewed. No pertinent past medical history. PAST SURGICAL HISTORY    Past Surgical History:   Procedure Laterality Date    SCROTAL SURGERY Right 5/19/2022    SCROTAL INCISION AND DRAINAGE - RIGHT performed by Bebo Salcido MD at [facility]    History reviewed. No pertinent family history. SOCIAL HISTORY    Social History     Tobacco Use    Smoking status: Current Every Day Smoker     Packs/day: 0.50    Smokeless tobacco: Never Used   Substance Use Topics    Alcohol use: Not Currently    Drug use: Never       ALLERGIES    No Known Allergies    MEDICATIONS    Current Outpatient Medications on File Prior to Encounter   Medication Sig Dispense Refill    fluconazole (DIFLUCAN) 100 MG tablet Take 1 tablet by mouth daily for 7 days 7 tablet 0    sodium hypochlorite (DAKINS) 0.125 % SOLN external solution Apply topically 2 times daily 1000 mL 1    ibuprofen (ADVIL;MOTRIN) 200 MG tablet Take 400 mg by mouth every 6 hours as needed for Pain       No current facility-administered medications on file prior to encounter. REVIEW OF SYSTEMS  A comprehensive review of systems was negative except for what has been indicated above and: none    Written patient dismissal instructions given to patient and signed by patient or POA.          Electronically signed by Blanca Freeman MD on 6/7/2022 at 9:31 AM

## 2022-06-14 ENCOUNTER — HOSPITAL ENCOUNTER (OUTPATIENT)
Dept: WOUND CARE | Age: 32
Discharge: HOME | End: 2022-06-14
Payer: COMMERCIAL

## 2022-06-14 VITALS
HEART RATE: 100 BPM | RESPIRATION RATE: 18 BRPM | SYSTOLIC BLOOD PRESSURE: 168 MMHG | DIASTOLIC BLOOD PRESSURE: 97 MMHG | TEMPERATURE: 98.1 F

## 2022-06-14 DIAGNOSIS — S31.30XA OPEN WOUND OF SCROTUM WITH COMPLICATION, INITIAL ENCOUNTER: ICD-10-CM

## 2022-06-14 DIAGNOSIS — N49.2 SCROTAL ABSCESS: ICD-10-CM

## 2022-06-14 DIAGNOSIS — S31.30XD: Primary | ICD-10-CM

## 2022-06-14 PROCEDURE — 11042 DBRDMT SUBQ TIS 1ST 20SQCM/<: CPT

## 2022-06-14 PROCEDURE — 11042 DBRDMT SUBQ TIS 1ST 20SQCM/<: CPT | Performed by: EMERGENCY MEDICINE

## 2022-06-14 RX ORDER — GENTAMICIN SULFATE 1 MG/G
OINTMENT TOPICAL ONCE
Status: CANCELLED | OUTPATIENT
Start: 2022-06-14 | End: 2022-06-14

## 2022-06-14 RX ORDER — BETAMETHASONE DIPROPIONATE 0.05 %
OINTMENT (GRAM) TOPICAL ONCE
Status: CANCELLED | OUTPATIENT
Start: 2022-06-14 | End: 2022-06-14

## 2022-06-14 RX ORDER — CLOBETASOL PROPIONATE 0.5 MG/G
OINTMENT TOPICAL ONCE
Status: CANCELLED | OUTPATIENT
Start: 2022-06-14 | End: 2022-06-14

## 2022-06-14 RX ORDER — BACITRACIN, NEOMYCIN, POLYMYXIN B 400; 3.5; 5 [USP'U]/G; MG/G; [USP'U]/G
OINTMENT TOPICAL ONCE
Status: CANCELLED | OUTPATIENT
Start: 2022-06-14 | End: 2022-06-14

## 2022-06-14 RX ORDER — GINSENG 100 MG
CAPSULE ORAL ONCE
Status: CANCELLED | OUTPATIENT
Start: 2022-06-14 | End: 2022-06-14

## 2022-06-14 RX ORDER — LIDOCAINE 40 MG/G
CREAM TOPICAL ONCE
Status: CANCELLED | OUTPATIENT
Start: 2022-06-14 | End: 2022-06-14

## 2022-06-14 RX ORDER — LIDOCAINE HYDROCHLORIDE 20 MG/ML
JELLY TOPICAL ONCE
Status: CANCELLED | OUTPATIENT
Start: 2022-06-14 | End: 2022-06-14

## 2022-06-14 RX ORDER — LIDOCAINE 50 MG/G
OINTMENT TOPICAL ONCE
Status: CANCELLED | OUTPATIENT
Start: 2022-06-14 | End: 2022-06-14

## 2022-06-14 RX ORDER — BACITRACIN ZINC AND POLYMYXIN B SULFATE 500; 1000 [USP'U]/G; [USP'U]/G
OINTMENT TOPICAL ONCE
Status: CANCELLED | OUTPATIENT
Start: 2022-06-14 | End: 2022-06-14

## 2022-06-14 RX ORDER — LIDOCAINE HYDROCHLORIDE 40 MG/ML
SOLUTION TOPICAL ONCE
Status: CANCELLED | OUTPATIENT
Start: 2022-06-14 | End: 2022-06-14

## 2022-06-14 RX ORDER — LIDOCAINE HYDROCHLORIDE 40 MG/ML
SOLUTION TOPICAL ONCE
Status: COMPLETED | OUTPATIENT
Start: 2022-06-14 | End: 2022-06-14

## 2022-06-14 RX ADMIN — LIDOCAINE HYDROCHLORIDE: 40 SOLUTION TOPICAL at 08:42

## 2022-06-14 ASSESSMENT — PAIN SCALES - GENERAL: PAINLEVEL_OUTOF10: 0

## 2022-06-14 NOTE — PROGRESS NOTES
[address] [facility]   Medical Staff Progress Note     100 [facility] RECORD NUMBER:  [id number]  AGE: 27 y.o. GENDER: male  : 1990  EPISODE DATE:  2022    Chief complaint and reason for visit:     Chief Complaint   Patient presents with    Wound Check     follow up scrotum wound      Patient presenting for follow up evaluation of wound(s) per chief complaint. Subjective: Symptoms, wound related issues, or other pertinent wound history since last visit: Has issues with dressing not staying in place at times. Usually this only occurs when he is walking up stairs at home. Medical Decision Making:     Problem List Items Addressed This Visit        Genitourinary    Scrotal abscess    Relevant Orders    Initiate Outpatient Wound Care Protocol    Open wound of scrotum with complication - Primary    Relevant Orders    Initiate Outpatient Wound Care Protocol          Wounds and Treatment Plan:   Right-sided scrotal wound, surgical.  Severity of muscle necrosis. Wound is slowly healing, getting smaller. Nonviable tissue. epithelizing too early along walls of wound. Debridement. Getting smaller. Collagen, hydrogel, moist gauze. Other associated diagnoses or problems addressed:   Impaired mobility. Specific instructions on movement and dressings/boxer briefs to keep dressings in place.  Cutaneous candidiasis. Significantly improved. Nearly all resolved. Continue Desitin zinc oxide. Diflucan completed    Pertinent imaging reviewed including independent interpretation include:   none    Pertinent labs reviewed. Medical records and review of external note (s) from other providers done as well.     New lab or imaging orders placed:   none    Prescription drug management: dakins      Discussion of management or test interpretation with another provider, other qualified health care professional and other external source. Comorbid conditions affecting wound healing: As per [facility] which was reviewed. Risk of complications and/or mortality of patient management:  1. This patient has a moderate risk of morbidity and mortality from additional diagnostic testing or treatment. This is due to the above conditions affecting wound healing as well as patient and procedure risk factors. Education and discussion held with patient regarding these disease processes pertinent to wound(s). 2. Other pertinent decisions include: minor surgery or procedures as below. 3. The patient's diagnosis or treatment is not significantly limited by social determinants of health as noted by: N/A . Wound 05/24/22 Scrotum #1(acquired on 5/19/22) (Active)   Wound Image   06/07/22 0903   Dressing Status New dressing applied 05/24/22 1120   Wound Cleansed Cleansed with saline 05/24/22 1037   Dressing/Treatment Collagen with Ag;Gauze dressing/dressing sponge;Zinc paste 06/07/22 0951   Wound Length (cm) 4.1 cm 06/14/22 0834   Wound Width (cm) 1.4 cm 06/14/22 0834   Wound Depth (cm) 1.7 cm 06/14/22 0834   Wound Surface Area (cm^2) 5.74 cm^2 06/14/22 0834   Change in Wound Size % (l*w) 30.42 06/14/22 0834   Wound Volume (cm^3) 9.758 cm^3 06/14/22 0834   Wound Healing % 72 06/14/22 0834   Post-Procedure Length (cm) 4.1 cm 06/14/22 0907   Post-Procedure Width (cm) 1.4 cm 06/14/22 0907   Post-Procedure Depth (cm) 1.8 cm 06/14/22 0907   Post-Procedure Surface Area (cm^2) 5.74 cm^2 06/14/22 0907   Post-Procedure Volume (cm^3) 10.332 cm^3 06/14/22 0907   Wound Assessment Granulation tissue;Slough 06/14/22 0834   Drainage Amount Scant 06/14/22 0834   Drainage Description Serosanguinous; Serous 06/14/22 0834   Odor None 06/14/22 0834   Angi-wound Assessment Fragile 06/14/22 0834   Margins Undefined edges 06/14/22 0834   Wound Thickness Description not for Pressure Injury Full thickness 06/14/22 0834   Number of days: 20       Procedures done during this encounter: benign  Musculoskeletal: Baseline range of motion in joints. Nontender calves. No cyanosis. Edema trace. Neurologic: Speech normal. At baseline without new focal deficits. Mental status normal or at baseline    PAST MEDICAL HISTORY    No past medical history on file. PAST SURGICAL HISTORY    Past Surgical History:   Procedure Laterality Date    SCROTAL SURGERY Right 5/19/2022    SCROTAL INCISION AND DRAINAGE - RIGHT performed by Courtney Curry MD at \[facility]\""    No family history on file. SOCIAL HISTORY    Social History     Tobacco Use    Smoking status: Current Every Day Smoker     Packs/day: 0.50    Smokeless tobacco: Never Used   Substance Use Topics    Alcohol use: Not Currently    Drug use: Never       ALLERGIES    No Known Allergies    MEDICATIONS    Current Outpatient Medications on File Prior to Encounter   Medication Sig Dispense Refill    sodium hypochlorite (DAKINS) 0.125 % SOLN external solution Apply topically 2 times daily 1000 mL 1    ibuprofen (ADVIL;MOTRIN) 200 MG tablet Take 400 mg by mouth every 6 hours as needed for Pain       No current facility-administered medications on file prior to encounter. REVIEW OF SYSTEMS  A comprehensive review of systems was negative except for what has been indicated above and: none    Written patient dismissal instructions given to patient and signed by patient or POA.          Electronically signed by Juan Wiley MD on 6/14/2022 at 9:38 AM

## 2022-06-20 LAB
FUNGUS (MYCOLOGY) CULTURE: NORMAL
FUNGUS STAIN: NORMAL

## 2022-06-21 ENCOUNTER — HOSPITAL ENCOUNTER (OUTPATIENT)
Dept: WOUND CARE | Age: 32
Discharge: HOME | End: 2022-06-21
Payer: COMMERCIAL

## 2022-06-21 VITALS
HEART RATE: 73 BPM | DIASTOLIC BLOOD PRESSURE: 101 MMHG | RESPIRATION RATE: 16 BRPM | TEMPERATURE: 97.1 F | SYSTOLIC BLOOD PRESSURE: 160 MMHG

## 2022-06-21 DIAGNOSIS — S31.30XA OPEN WOUND OF SCROTUM WITH COMPLICATION, INITIAL ENCOUNTER: ICD-10-CM

## 2022-06-21 DIAGNOSIS — S31.30XD: Primary | ICD-10-CM

## 2022-06-21 DIAGNOSIS — N49.2 SCROTAL ABSCESS: ICD-10-CM

## 2022-06-21 PROCEDURE — 11042 DBRDMT SUBQ TIS 1ST 20SQCM/<: CPT

## 2022-06-21 PROCEDURE — 11042 DBRDMT SUBQ TIS 1ST 20SQCM/<: CPT | Performed by: EMERGENCY MEDICINE

## 2022-06-21 RX ORDER — LIDOCAINE 40 MG/G
CREAM TOPICAL ONCE
Status: CANCELLED | OUTPATIENT
Start: 2022-06-21 | End: 2022-06-21

## 2022-06-21 RX ORDER — LIDOCAINE 50 MG/G
OINTMENT TOPICAL ONCE
Status: CANCELLED | OUTPATIENT
Start: 2022-06-21 | End: 2022-06-21

## 2022-06-21 RX ORDER — LIDOCAINE HYDROCHLORIDE 20 MG/ML
JELLY TOPICAL ONCE
Status: CANCELLED | OUTPATIENT
Start: 2022-06-21 | End: 2022-06-21

## 2022-06-21 RX ORDER — GENTAMICIN SULFATE 1 MG/G
OINTMENT TOPICAL ONCE
Status: CANCELLED | OUTPATIENT
Start: 2022-06-21 | End: 2022-06-21

## 2022-06-21 RX ORDER — BACITRACIN ZINC AND POLYMYXIN B SULFATE 500; 1000 [USP'U]/G; [USP'U]/G
OINTMENT TOPICAL ONCE
Status: CANCELLED | OUTPATIENT
Start: 2022-06-21 | End: 2022-06-21

## 2022-06-21 RX ORDER — GINSENG 100 MG
CAPSULE ORAL ONCE
Status: CANCELLED | OUTPATIENT
Start: 2022-06-21 | End: 2022-06-21

## 2022-06-21 RX ORDER — BETAMETHASONE DIPROPIONATE 0.05 %
OINTMENT (GRAM) TOPICAL ONCE
Status: CANCELLED | OUTPATIENT
Start: 2022-06-21 | End: 2022-06-21

## 2022-06-21 RX ORDER — CLOBETASOL PROPIONATE 0.5 MG/G
OINTMENT TOPICAL ONCE
Status: CANCELLED | OUTPATIENT
Start: 2022-06-21 | End: 2022-06-21

## 2022-06-21 RX ORDER — LIDOCAINE 40 MG/G
CREAM TOPICAL ONCE
Status: COMPLETED | OUTPATIENT
Start: 2022-06-21 | End: 2022-06-21

## 2022-06-21 RX ORDER — BACITRACIN, NEOMYCIN, POLYMYXIN B 400; 3.5; 5 [USP'U]/G; MG/G; [USP'U]/G
OINTMENT TOPICAL ONCE
Status: CANCELLED | OUTPATIENT
Start: 2022-06-21 | End: 2022-06-21

## 2022-06-21 RX ORDER — LIDOCAINE HYDROCHLORIDE 40 MG/ML
SOLUTION TOPICAL ONCE
Status: CANCELLED | OUTPATIENT
Start: 2022-06-21 | End: 2022-06-21

## 2022-06-21 RX ADMIN — LIDOCAINE: 40 CREAM TOPICAL at 10:34

## 2022-06-21 ASSESSMENT — PAIN SCALES - GENERAL
PAINLEVEL_OUTOF10: 0
PAINLEVEL_OUTOF10: 0

## 2022-06-21 NOTE — PROGRESS NOTES
[address] and [address] [facility]   Medical Staff Progress Note     [address] [facility] RECORD NUMBER:  [id number]  AGE: 27 y.o. GENDER: male  : 1990  EPISODE DATE:  2022    Chief complaint and reason for visit:     Chief Complaint   Patient presents with    Wound Check     f/u visit - scrotal wound      Patient presenting for follow up evaluation of wound(s) per chief complaint. Subjective: Symptoms, wound related issues, or other pertinent wound history since last visit: no new issues. Medical Decision Making:     Problem List Items Addressed This Visit        Genitourinary    Scrotal abscess    Relevant Orders    Initiate Outpatient Wound Care Protocol    Open wound of scrotum with complication - Primary    Relevant Orders    Initiate Outpatient Wound Care Protocol        Wounds and Treatment Plan:   Right-sided scrotal wound, surgical.  Severity of muscle necrosis. Wound is slowly healing, getting smaller. Nonviable tissue noted but less. Debridement. Mild maceration. Will do silver alginate and gauze/cover dressing. Other associated diagnoses or problems addressed:   Impaired mobility. Specific instructions on movement and dressings/boxer briefs to keep dressings in place.  Cutaneous candidiasis. Significantly improved. Nearly all resolved. Continue Desitin zinc oxide. Diflucan completed    Pertinent imaging reviewed including independent interpretation include:   none    Pertinent labs reviewed. Medical records and review of external note (s) from other providers done as well. New lab or imaging orders placed:   none    Prescription drug management: dakins  to use for cleaning. Discussion of management or test interpretation with another provider, other qualified health care professional and other external source. Comorbid conditions affecting wound healing: As per PMH which was reviewed.     Risk of complications and/or mortality of patient management:  1. This patient has a moderate risk of morbidity and mortality from additional diagnostic testing or treatment. This is due to the above conditions affecting wound healing as well as patient and procedure risk factors. Education and discussion held with patient regarding these disease processes pertinent to wound(s). 2. Other pertinent decisions include: minor surgery or procedures as below. 3. The patient's diagnosis or treatment is not significantly limited by social determinants of health as noted by: N/A . Wound 05/24/22 Scrotum #1(acquired on 5/19/22) (Active)   Wound Image   06/07/22 0903   Dressing Status New dressing applied 05/24/22 1120   Wound Cleansed Cleansed with saline 05/24/22 1037   Dressing/Treatment Collagen with Ag;Gauze dressing/dressing sponge; Hydrating gel 06/14/22 1450   Wound Length (cm) 2.6 cm 06/21/22 1019   Wound Width (cm) 0.9 cm 06/21/22 1019   Wound Depth (cm) 0.1 cm 06/21/22 1019   Wound Surface Area (cm^2) 2.34 cm^2 06/21/22 1019   Change in Wound Size % (l*w) 71.64 06/21/22 1019   Wound Volume (cm^3) 0.234 cm^3 06/21/22 1019   Wound Healing % 99 06/21/22 1019   Post-Procedure Length (cm) 2.6 cm 06/21/22 1048   Post-Procedure Width (cm) 0.9 cm 06/21/22 1048   Post-Procedure Depth (cm) 0.2 cm 06/21/22 1048   Post-Procedure Surface Area (cm^2) 2.34 cm^2 06/21/22 1048   Post-Procedure Volume (cm^3) 0.468 cm^3 06/21/22 1048   Wound Assessment Granulation tissue;Slough 06/21/22 1019   Drainage Amount Small 06/21/22 1019   Drainage Description Serosanguinous; Serous 06/21/22 1019   Odor None 06/21/22 1019   Angi-wound Assessment Dry/flaky 06/21/22 1019   Margins Undefined edges 06/21/22 1019   Wound Thickness Description not for Pressure Injury Full thickness 06/21/22 1019   Number of days: 28     Procedures done during this encounter:   Debridement: Excisional Debridement  Indications:  Based on my examination of this patient's wound(s)/ulcer(s) today, debridement is required to promote healing and evaluate the wound base. Risks and benefits discussed with patient who has agreed to proceed. Performed by: Horace Macias MD  Consent obtained:  Yes  Time out taken:  Yes  Pain Control: Anesthetic  Anesthetic: 4% Lidocaine Cream   Using curette the wound(s)/ulcer(s) was/were debrided down through and including the removal of subcutaneous tissue. Devitalized Tissue Debrided:  fibrin, biofilm, slough and exudate  Pre Debridement Measurements:  Are located in the Wound/Ulcer Documentation Flow Sheet  Wound/Ulcer #: 1  Post Debridement Measurements:  Wound/Ulcer Descriptions are Pre Debridement except measurements: Total Surface Area Debrided:  2.34 sq cm   Diabetic/Pressure/Non Pressure Ulcers only:  Ulcer: Non-Pressure ulcer, muscle necrosis   Estimated Blood Loss:  Minimal  Hemostasis Achieved:  by pressure  Procedural Pain:  1  / 10   Post Procedural Pain:  0 / 10   Response to treatment:  Well tolerated by patient. HISTORY of PRESENT ILLNESS HPI     Alisa Cordova is a [address] [place] [address] y.o. male who presents today for wound/ulcer evaluation. History of Wound Context: Per original history and physical on this patient. Changes in history since last exam:  None    Objective:    BP (!) 160/101   Pulse 73   Temp 97.1 °F (36.2 °C) (Temporal)   Resp 16   Wt Readings from Last 3 Encounters:   05/24/22 249 lb 5.4 oz (113.1 kg)   05/21/22 248 lb 10.9 oz (112.8 kg)       PHYSICAL EXAM  General: Alert and in no acute distress. Normal appearing  Skin: Warm and dry, no rash  Head: Normocephalic and atraumatic  Eyes: Extraocular eye movements intact, conjunctivae normal, and sclera anicteric  ENT: Hearing grossly normal bilaterally. Normal appearance  Respiratory: no chest wall tenderness. no respiratory distress  GI: Abdomen non-tender and benign  Musculoskeletal: Baseline range of motion in joints. Nontender calves. No cyanosis. Edema trace.   Neurologic: Speech normal. At baseline without new focal deficits. Mental status normal or at baseline    PAST MEDICAL HISTORY    History reviewed. No pertinent past medical history. PAST SURGICAL HISTORY    Past Surgical History:   Procedure Laterality Date    SCROTAL SURGERY Right 5/19/2022    SCROTAL INCISION AND DRAINAGE - RIGHT performed [facility]ung[facility] [place] [facility]    Family History   Problem Relation Age of Onset    Peripheral Vascular Disease Mother     Heart Attack Father        SOCIAL HISTORY    Social History     Tobacco Use    Smoking status: Current Every Day Smoker     Packs/day: 0.50    Smokeless tobacco: Never Used   Substance Use Topics    Alcohol use: Not Currently    Drug use: Never       ALLERGIES    No Known Allergies    MEDICATIONS    Current Outpatient Medications on File Prior to Encounter   Medication Sig Dispense Refill    sodium hypochlorite (DAKINS) 0.125 % SOLN external solution Apply topically 2 times daily 1000 mL 1    ibuprofen (ADVIL;MOTRIN) 200 MG tablet Take 400 mg by mouth every 6 hours as needed for Pain       No current facility-administered medications on file prior to encounter. REVIEW OF SYSTEMS  A comprehensive review of systems was negative except for what has been indicated above and: none    Discharge Instructions       [address] and [facility]   Physician Orders and Discharge Instructions  [address] [place] [address]  Telephone: [phone]     NAME:  Yudith Pacheco  YOB: 1990  MEDICAL RECORD NUMBER:  [id number]  DATE:  6/21/2022     Wound Cleansing:   Do not scrub or use excessive force. Cleanse wound prior to applying a clean dressing with:  [x]? Normal Saline  []? Keep Wound Dry in Shower    []? Wound Cleanser   []? Cleanse wound with Mild Soap & Water  []? May Shower at Discharge   []? Other:      Topical Treatments:  Do not apply lotions, creams, or ointments to wound bed unless directed. []? Apply moisturizing lotion to skin surrounding the wound prior to dressing change. []? Apply antifungal ointment to skin surrounding the wound prior to dressing change. []? Apply thin film of moisture barrier ointment to skin immediately around wound. [x]? Other:  ZINC OXIDE TO RED RASH TO GROINS         **MAY BEGIN USING DAKINS DAMPENED GAUZE TO GENTLY CLEANSE WOUND PRIOR TO DRESSING CHANGES**                Dressings:                  Wound Location SCROTUM             [x]? Apply Primary Dressing:                                          [x]? ALGINATE WITH SILVER  []? Pack wound loosely with    []? Other   [x]? Cover and Secure with:                   [x]? GAUZE OR SANITARY NAPKINS- DO NOT NEED TO PACK INTO WOUND                                    []? Other:                Avoid contact of tape with skin. [x]? Change dressing:   [x]? THREE TIMES PER WEK                       Pressure Relief:  [x]? When sitting, shift position or do seat lifts every 15 minutes. []? Wheelchair cushion           []? Specialty Bed/Mattress  []? Turn every 2 hours when in bed. Avoid position directing pressure on wound site. Limit side lying to 30 degree tilt. Limit HOB elevation to 30 degrees.        Dietary:  [x]? Diet as tolerated:     []? Calorie Diabetic Diet:           []? No Added Salt:  [x]? Increase Protein:     []? Other:              Activity:  [x]? Activity as tolerated:       If you are still having pain after you go home:  [x]? Elevate the affected limb. [x]? Use over-the-counter medications you would normally use for pain as permitted by your family doctor. [x]? For persistent pain not relieved by the above interventions, please call your family doctor.   Dorla Riedel   Return Appointment:  []? Wound and dressing supply provider:   []? ECF or Home Healthcare:   []? Wound Assessment:          []?  Physician or NP scheduled for Wound Assessment:   [x]? Return Appointment: With Chey Cervantes MD  in  1 Week(s)  []? Ordered tests:        Nurse Case Manger:  NAVE[facility]  Electronically signed by Elena Gordon RN on 6/21/2022 at [place] [address] Information: Should you experience any significant changes in your wound(s) or have questions about your wound care, please contact the [address] [place] [address] at [address] 8:00 am - 4:30 pm and Friday 8:00 am - 12:30 pm.  If you need help with your wound outside these hours and cannot wait until we are again available, contact your PCP or go to the hospital emergency room.      PLEASE NOTE: IF YOU ARE UNABLE TO OBTAIN WOUND SUPPLIES, CONTINUE TO USE THE SUPPLIES YOU HAVE AVAILABLE UNTIL YOU ARE ABLE TO [facility]. IT IS MOST IMPORTANT TO KEEP THE WOUND COVERED AT ALL TIMES.      Physician Signature:_______________________     Date: ___________ Time:  ____________                                DR. Felton Lombard, MD        Electronically signed by Yuan Jian MD on 6/21/2022 at 11:02 AM

## 2022-06-28 ENCOUNTER — HOSPITAL ENCOUNTER (OUTPATIENT)
Dept: WOUND CARE | Age: 32
Discharge: HOME | End: 2022-06-28
Payer: COMMERCIAL

## 2022-06-28 VITALS
SYSTOLIC BLOOD PRESSURE: 171 MMHG | HEART RATE: 94 BPM | RESPIRATION RATE: 17 BRPM | TEMPERATURE: 97.7 F | DIASTOLIC BLOOD PRESSURE: 96 MMHG

## 2022-06-28 DIAGNOSIS — N49.2 SCROTAL ABSCESS: ICD-10-CM

## 2022-06-28 DIAGNOSIS — S31.30XA OPEN WOUND OF SCROTUM WITH COMPLICATION, INITIAL ENCOUNTER: Primary | ICD-10-CM

## 2022-06-28 PROCEDURE — 11042 DBRDMT SUBQ TIS 1ST 20SQCM/<: CPT

## 2022-06-28 PROCEDURE — 11042 DBRDMT SUBQ TIS 1ST 20SQCM/<: CPT | Performed by: NURSE PRACTITIONER

## 2022-06-28 RX ORDER — LIDOCAINE 40 MG/G
CREAM TOPICAL ONCE
Status: CANCELLED | OUTPATIENT
Start: 2022-06-28 | End: 2022-06-28

## 2022-06-28 RX ORDER — CLOBETASOL PROPIONATE 0.5 MG/G
OINTMENT TOPICAL ONCE
Status: CANCELLED | OUTPATIENT
Start: 2022-06-28 | End: 2022-06-28

## 2022-06-28 RX ORDER — LIDOCAINE HYDROCHLORIDE 40 MG/ML
SOLUTION TOPICAL ONCE
Status: CANCELLED | OUTPATIENT
Start: 2022-06-28 | End: 2022-06-28

## 2022-06-28 RX ORDER — GENTAMICIN SULFATE 1 MG/G
OINTMENT TOPICAL ONCE
Status: CANCELLED | OUTPATIENT
Start: 2022-06-28 | End: 2022-06-28

## 2022-06-28 RX ORDER — LIDOCAINE 50 MG/G
OINTMENT TOPICAL ONCE
Status: CANCELLED | OUTPATIENT
Start: 2022-06-28 | End: 2022-06-28

## 2022-06-28 RX ORDER — LIDOCAINE HYDROCHLORIDE 20 MG/ML
JELLY TOPICAL ONCE
Status: CANCELLED | OUTPATIENT
Start: 2022-06-28 | End: 2022-06-28

## 2022-06-28 RX ORDER — BETAMETHASONE DIPROPIONATE 0.05 %
OINTMENT (GRAM) TOPICAL ONCE
Status: CANCELLED | OUTPATIENT
Start: 2022-06-28 | End: 2022-06-28

## 2022-06-28 RX ORDER — GINSENG 100 MG
CAPSULE ORAL ONCE
Status: CANCELLED | OUTPATIENT
Start: 2022-06-28 | End: 2022-06-28

## 2022-06-28 RX ORDER — BACITRACIN ZINC AND POLYMYXIN B SULFATE 500; 1000 [USP'U]/G; [USP'U]/G
OINTMENT TOPICAL ONCE
Status: CANCELLED | OUTPATIENT
Start: 2022-06-28 | End: 2022-06-28

## 2022-06-28 RX ORDER — LIDOCAINE HYDROCHLORIDE 40 MG/ML
SOLUTION TOPICAL ONCE
Status: COMPLETED | OUTPATIENT
Start: 2022-06-28 | End: 2022-06-28

## 2022-06-28 RX ORDER — BACITRACIN, NEOMYCIN, POLYMYXIN B 400; 3.5; 5 [USP'U]/G; MG/G; [USP'U]/G
OINTMENT TOPICAL ONCE
Status: CANCELLED | OUTPATIENT
Start: 2022-06-28 | End: 2022-06-28

## 2022-06-28 RX ADMIN — LIDOCAINE HYDROCHLORIDE: 40 SOLUTION TOPICAL at 09:40

## 2022-06-28 ASSESSMENT — PAIN SCALES - GENERAL: PAINLEVEL_OUTOF10: 0

## 2022-06-29 NOTE — PROGRESS NOTES
[address]   Progress Note and Procedure Note      [facility] RECORD NUMBER:  [id number]  AGE: 27 y.o. GENDER: male  : 1990  EPISODE DATE:  2022    Subjective:     Chief Complaint   Patient presents with    Wound Check     Follow Up on Scrotum         HISTORY of PRESENT ILLNESS HPI     Conrad Hook is a 27 y.o. male who presents today for wound/ulcer evaluation. Pt denies new problems, but states at second day when changing dressing is noticing an odor from wound. No purulent drainage noted at this time, no fevers or overt signs of infection. History of Wound Context: 20 surgery for abscess right scrotum by Dr. Palma Stout. Has a post-op visit with Dr. Antionette Ormond this week. Changes dressings himself. He is now back to work. Wound/Ulcer Pain Timing/Severity: intermittent, mild  Quality of pain: tender  Severity:  1 / 10   Modifying Factors: position changes  Associated Signs/Symptoms: drainage and pain    Ulcer Identification:  Ulcer Type: non-healing surgical    Contributing Factors: smoking    Acute Wound: Nonhealing surgical due to infection    PAST MEDICAL HISTORY        Diagnosis Date    Open wound of scrotum with complication     Scrotal abscess 2022       PAST SURGICAL HISTORY    Past Surgical History:   Procedure Laterality Date    SCROTAL SURGERY Right 2022    SCROTAL INCISION AND DRAINAGE - RIGHT performed by Tricia Do MD at [address] [place] [address] Road History   Problem Relation Age of Onset    Peripheral Vascular Disease Mother     Heart Attack Father        SOCIAL HISTORY    Social History     Tobacco Use    Smoking status: Current Every Day Smoker     Packs/day: 0.50    Smokeless tobacco: Never Used   Substance Use Topics    Alcohol use: Not Currently    Drug use: Never     The patient was instructed/counseled on smoking cessation.   ALLERGIES    No Known Allergies    MEDICATIONS    Current Outpatient Medications on File Prior to Encounter   Medication Sig Dispense Refill    sodium hypochlorite (DAKINS) 0.125 % SOLN external solution Apply topically 2 times daily 1000 mL 1    ibuprofen (ADVIL;MOTRIN) 200 MG tablet Take 400 mg by mouth every 6 hours as needed for Pain       No current facility-administered medications on file prior to encounter. REVIEW OF SYSTEMS  Review of Systems    Pertinent items are noted in HPI. Objective:      BP (!) 171/96   Pulse 94   Temp 97.7 °F (36.5 °C) (Temporal)   Resp 17     Wt Readings from Last 3 Encounters:   05/24/22 249 lb 5.4 oz (113.1 kg)   05/21/22 248 lb 10.9 oz (112.8 kg)       PHYSICAL EXAM  Physical Exam    General Appearance: alert and oriented to person, place and time, well-developed and well-nourished, in no acute distress  Skin: warm and dry  Head: normocephalic and atraumatic  Eyes: pupils equal, round, and reactive to light  Pulmonary/Chest: normal air movement, no respiratory distress  Cardiovascular: normal rate and regular rhythm      Assessment:        Problem List Items Addressed This Visit     Scrotal abscess    Open wound of scrotum with complication - Primary           Procedure Note  Indications:  Based on my examination of this patient's wound(s)/ulcer(s) today, debridement is required to promote healing and evaluate the wound base. Performed by: CINTHIA Verduzco CNP    Consent obtained:  Yes    Time out taken:  Yes    Pain Control: Anesthetic  Anesthetic: 4% Lidocaine Liquid Topical       Debridement: Excisional Debridement    Using curette the wound(s)/ulcer(s) was/were debrided down through and including the removal of epidermis, dermis and subcutaneous tissue.         Devitalized Tissue Debrided:  fibrin, biofilm and slough    Pre Debridement Measurements:  Are located in the [facility]  Documentation Flow Sheet    Diabetic/Pressure/Non Pressure Ulcers only:  Ulcer: Non-Pressure ulcer, fat layer exposed     Wound/Ulcer #: 1    Post Debridement Measurements:  Wound/Ulcer Descriptions are Pre Debridement except measurements:    Wound 05/24/22 Scrotum #1(acquired on 5/19/22) (Active)   Wound Image   06/07/22 0903   Dressing Status Old drainage noted 06/28/22 0941   Wound Cleansed Other (Comment) 06/21/22 1055   Dressing/Treatment Alginate with Ag;Gauze dressing/dressing sponge 06/28/22 0953   Wound Length (cm) 1 cm 06/28/22 0941   Wound Width (cm) 1.1 cm 06/28/22 0941   Wound Depth (cm) 0.1 cm 06/28/22 0941   Wound Surface Area (cm^2) 1.1 cm^2 06/28/22 0941   Change in Wound Size % (l*w) 86.67 06/28/22 0941   Wound Volume (cm^3) 0.11 cm^3 06/28/22 0941   Wound Healing % 100 06/28/22 0941   Post-Procedure Length (cm) 1.1 cm 06/28/22 0953   Post-Procedure Width (cm) 1.2 cm 06/28/22 0953   Post-Procedure Depth (cm) 0.2 cm 06/28/22 0953   Post-Procedure Surface Area (cm^2) 1.32 cm^2 06/28/22 0953   Post-Procedure Volume (cm^3) 0.264 cm^3 06/28/22 0953   Distance Tunneling (cm) 2.5 cm 06/28/22 0941   Tunneling Position ___ O'Clock 5 06/28/22 0941   Wound Assessment Granulation tissue;Slough 06/28/22 0941   Drainage Amount Small 06/28/22 0941   Drainage Description Serous; Yellow 06/28/22 0941   Odor None 06/28/22 0941   Angi-wound Assessment Dry/flaky 06/28/22 0941   Margins Undefined edges 06/28/22 0941   Wound Thickness Description not for Pressure Injury Full thickness 06/28/22 0941   Number of days: 36          Total Surface Area Debrided:  1.32 sq cm     Estimated Blood Loss:  Minimal    Hemostasis Achieved:  by pressure    Procedural Pain:  2 / 10     Post Procedural Pain:  1 / 10     Response to treatment:  Well tolerated by patient. Plan:     Treatment Note please see attached Discharge Instructions    Written patient dismissal instructions given to patient and signed by patient or POA.          Discharge Instructions            [address] and [facility]   Physician Orders and Discharge Instructions  [address]   [address] [place], [place] [address]  Telephone: [phone]      FAX [phone]     NAME: Tristan Rodriguez  DATE OF BIRTH:  1990  MEDICAL RECORD NUMBER:  [id number]  DATE:  6/28/2022     Wound Cleansing:   Do not scrub or use excessive force. Cleanse wound prior to applying a clean dressing with:  [x]? ? Normal Saline  []? ? Keep Wound Dry in Shower    []? ? Wound Cleanser   []? ? Cleanse wound with Mild Soap & Water  []? ? May Shower at Discharge   []? ? Other:      Topical Treatments:  Do not apply lotions, creams, or ointments to wound bed unless directed. []?? Apply moisturizing lotion to skin surrounding the wound prior to dressing change. []?? Apply antifungal ointment to skin surrounding the wound prior to dressing change. []?? Apply thin film of moisture barrier ointment to skin immediately around wound. [x]? ? Other:  ZINC OXIDE TO RED RASH TO GROINS OR MAY USE A 2% MICONAZOLE FUNGAL POWDER          **MAY USE GENTLE SOAP AND WATER GAUZE TO GENTLY CLEANSE WOUND PRIOR TO DRESSING CHANGES**                Dressings:                  Wound Location SCROTUM             [x]? ? Apply Primary Dressing:                                          [x]? ? ALGINATE WITH SILVER  []? ? Pack wound loosely with    []? ? Other   [x]? ? Cover and Secure with:                   [x]? ? GAUZE OR SANITARY NAPKINS- DO NOT NEED TO PACK INTO WOUND                                    []? ? Other:                Avoid contact of tape with skin. [x]? ? Change dressing:   [x]? ? DAILY                      Pressure Relief:  [x]? ? When sitting, shift position or do seat lifts every 15 minutes. []?? Wheelchair cushion           []? ? Specialty Bed/Mattress  []? ? Turn every 2 hours when in bed.  Avoid position directing pressure on wound site.  Limit side lying to 30 degree tilt.  Limit HOB elevation to 30 degrees.        Dietary:  [x]? ? Diet as tolerated:     []? ? Calorie Diabetic Diet:           []? ? No Added Salt:  [x]? ? Increase Protein:     []? ? Other:              Activity:  [x]? ? Activity as tolerated:       If you are still having pain after you go home:  [x]? ? Elevate the affected limb.    [x]? ? Use over-the-counter medications you would normally use for pain as permitted by your family doctor. [x]? ? For persistent pain not relieved by the above interventions, please call your family doctor.             Return Appointment:  []?? Wound and dressing supply provider:   []?? ECF or Home Healthcare:   []?? Wound Assessment:          []? ? Physician or NP scheduled for Wound Assessment:   [x]? ? Return Appointment: With DR. Jeni Herrera MD [facility] Week(s)  []? ? Ordered tests:         Nurse Case Manger: [facility]  Electronically signed by Brittanie Antoine RN on 6/28/2022 at 9:55 AM        [address] [place] [address] Information: Should you experience any significant changes in your wound(s) or have questions about your wound care, please contact the [address] [place] [address] [phone]  [address] 8:00 am - 4:30 pm and Friday 8:00 am - 12:30 pm.  If you need help with your wound outside these hours and cannot wait until we are again available, contact your PCP or go to the hospital emergency room.      PLEASE NOTE: IF YOU ARE UNABLE TO OBTAIN WOUND SUPPLIES, CONTINUE TO USE THE SUPPLIES YOU HAVE AVAILABLE UNTIL YOU ARE ABLE TO [facility]. IT IS MOST IMPORTANT TO KEEP THE WOUND COVERED AT ALL TIMES.     Physician Signature:_______________________     Date: ___________ Time:  ____________                                Coni Walters MD          Electronically signed by CINTHIA Mandujano CNP on 6/29/2022 at 12:37 PM

## 2022-07-05 ENCOUNTER — HOSPITAL ENCOUNTER (OUTPATIENT)
Dept: WOUND CARE | Age: 32
Discharge: HOME | End: 2022-07-05
Payer: COMMERCIAL

## 2022-07-05 VITALS
RESPIRATION RATE: 16 BRPM | TEMPERATURE: 97.3 F | DIASTOLIC BLOOD PRESSURE: 99 MMHG | SYSTOLIC BLOOD PRESSURE: 155 MMHG | HEART RATE: 95 BPM

## 2022-07-05 DIAGNOSIS — S31.30XA OPEN WOUND OF SCROTUM WITH COMPLICATION, INITIAL ENCOUNTER: Primary | ICD-10-CM

## 2022-07-05 DIAGNOSIS — N49.2 SCROTAL ABSCESS: ICD-10-CM

## 2022-07-05 LAB
AFB CULTURE (MYCOBACTERIA): NORMAL
AFB SMEAR: NORMAL

## 2022-07-05 PROCEDURE — 11042 DBRDMT SUBQ TIS 1ST 20SQCM/<: CPT | Performed by: EMERGENCY MEDICINE

## 2022-07-05 PROCEDURE — 11042 DBRDMT SUBQ TIS 1ST 20SQCM/<: CPT

## 2022-07-05 RX ORDER — CLOBETASOL PROPIONATE 0.5 MG/G
OINTMENT TOPICAL ONCE
Status: CANCELLED | OUTPATIENT
Start: 2022-07-05 | End: 2022-07-05

## 2022-07-05 RX ORDER — LIDOCAINE 40 MG/G
CREAM TOPICAL ONCE
Status: CANCELLED | OUTPATIENT
Start: 2022-07-05 | End: 2022-07-05

## 2022-07-05 RX ORDER — LIDOCAINE HYDROCHLORIDE 20 MG/ML
JELLY TOPICAL ONCE
Status: CANCELLED | OUTPATIENT
Start: 2022-07-05 | End: 2022-07-05

## 2022-07-05 RX ORDER — LIDOCAINE 50 MG/G
OINTMENT TOPICAL ONCE
Status: CANCELLED | OUTPATIENT
Start: 2022-07-05 | End: 2022-07-05

## 2022-07-05 RX ORDER — LIDOCAINE 40 MG/G
CREAM TOPICAL ONCE
Status: COMPLETED | OUTPATIENT
Start: 2022-07-05 | End: 2022-07-05

## 2022-07-05 RX ORDER — BACITRACIN ZINC AND POLYMYXIN B SULFATE 500; 1000 [USP'U]/G; [USP'U]/G
OINTMENT TOPICAL ONCE
Status: CANCELLED | OUTPATIENT
Start: 2022-07-05 | End: 2022-07-05

## 2022-07-05 RX ORDER — BETAMETHASONE DIPROPIONATE 0.05 %
OINTMENT (GRAM) TOPICAL ONCE
Status: CANCELLED | OUTPATIENT
Start: 2022-07-05 | End: 2022-07-05

## 2022-07-05 RX ORDER — GENTAMICIN SULFATE 1 MG/G
OINTMENT TOPICAL ONCE
Status: CANCELLED | OUTPATIENT
Start: 2022-07-05 | End: 2022-07-05

## 2022-07-05 RX ORDER — GINSENG 100 MG
CAPSULE ORAL ONCE
Status: CANCELLED | OUTPATIENT
Start: 2022-07-05 | End: 2022-07-05

## 2022-07-05 RX ORDER — LIDOCAINE HYDROCHLORIDE 40 MG/ML
SOLUTION TOPICAL ONCE
Status: CANCELLED | OUTPATIENT
Start: 2022-07-05 | End: 2022-07-05

## 2022-07-05 RX ORDER — BACITRACIN, NEOMYCIN, POLYMYXIN B 400; 3.5; 5 [USP'U]/G; MG/G; [USP'U]/G
OINTMENT TOPICAL ONCE
Status: CANCELLED | OUTPATIENT
Start: 2022-07-05 | End: 2022-07-05

## 2022-07-05 RX ADMIN — LIDOCAINE: 40 CREAM TOPICAL at 09:19

## 2022-07-05 ASSESSMENT — PAIN SCALES - GENERAL
PAINLEVEL_OUTOF10: 0
PAINLEVEL_OUTOF10: 0

## 2022-07-05 NOTE — PROGRESS NOTES
[address] and [address] [facility]   Medical Staff Progress Note     [facility] RECORD NUMBER:  [id number]  AGE: 27 y.o. GENDER: male  : 1990  EPISODE DATE:  2022    Chief complaint and reason for visit:     Chief Complaint   Patient presents with    Wound Check     Follow up for scrotal wound. Patient presenting for follow up evaluation of wound(s) per chief complaint. Subjective: Symptoms, wound related issues, or other pertinent wound history since last visit: no new issues. Medical Decision Making:     Problem List Items Addressed This Visit        Genitourinary    Scrotal abscess    Relevant Orders    Initiate Outpatient Wound Care Protocol    Open wound of scrotum with complication - Primary    Relevant Orders    Initiate Outpatient Wound Care Protocol        Wounds and Treatment Plan:   Right-sided scrotal wound, surgical.  Severity of muscle necrosis. Wound is slowly healing, getting smaller. Nonviable tissue noted but less. Debridement. Mild maceration. silver alginate and gauze/cover dressing. Other associated diagnoses or problems addressed:   Impaired mobility. Specific instructions on movement and dressings/boxer briefs to keep dressings in place.  Cutaneous candidiasis. Significantly improved. Nearly all resolved. Continue Desitin zinc oxide. Diflucan completed    Pertinent imaging reviewed including independent interpretation include:   none    Pertinent labs reviewed. Medical records and review of external note (s) from other providers done as well. New lab or imaging orders placed:   none    Prescription drug management: dakins  to use for cleaning. Discussion of management or test interpretation with another provider, other qualified health care professional and other external source. Comorbid conditions affecting wound healing: As per PMH which was reviewed.     Risk of complications and/or mortality of patient management:  1. This patient has a moderate risk of morbidity and mortality from additional diagnostic testing or treatment. This is due to the above conditions affecting wound healing as well as patient and procedure risk factors. Education and discussion held with patient regarding these disease processes pertinent to wound(s). 2. Other pertinent decisions include: minor surgery or procedures as below. 3. The patient's diagnosis or treatment is not significantly limited by social determinants of health as noted by: N/A . Wound 05/24/22 Scrotum #1(acquired on 5/19/22) (Active)   Wound Image   06/07/22 0903   Dressing Status Old drainage noted 06/28/22 0941   Wound Cleansed Other (Comment) 06/21/22 1055   Dressing/Treatment Alginate with Ag;Gauze dressing/dressing sponge 06/28/22 0953   Wound Length (cm) 1 cm 07/05/22 0919   Wound Width (cm) 0.5 cm 07/05/22 0919   Wound Depth (cm) 2.2 cm 07/05/22 0919   Wound Surface Area (cm^2) 0.5 cm^2 07/05/22 0919   Change in Wound Size % (l*w) 93.94 07/05/22 0919   Wound Volume (cm^3) 1.1 cm^3 07/05/22 0919   Wound Healing % 97 07/05/22 0919   Post-Procedure Length (cm) 1.1 cm 06/28/22 0953   Post-Procedure Width (cm) 1.2 cm 06/28/22 0953   Post-Procedure Depth (cm) 0.2 cm 06/28/22 0953   Post-Procedure Surface Area (cm^2) 1.32 cm^2 06/28/22 0953   Post-Procedure Volume (cm^3) 0.264 cm^3 06/28/22 0953   Distance Tunneling (cm) 2.5 cm 06/28/22 0941   Tunneling Position ___ O'Clock 5 06/28/22 0941   Wound Assessment Granulation tissue;Slough 07/05/22 0919   Drainage Amount Small 07/05/22 0919   Drainage Description Serous; Yellow 07/05/22 0919   Odor None 07/05/22 0919   Angi-wound Assessment Dry/flaky 07/05/22 0919   Margins Undefined edges 07/05/22 0919   Wound Thickness Description not for Pressure Injury Full thickness 07/05/22 0919   Number of days: 41     Procedures done during this encounter:   Debridement: Excisional Debridement  Indications:  Based on my examination of this patient's wound(s)/ulcer(s) today, debridement is required to promote healing and evaluate the wound base. Risks and benefits discussed with patient who has agreed to proceed. Performed by: Marizol Murcia MD  Consent obtained:  Yes  Time out taken:  Yes  Pain Control: Anesthetic  Anesthetic: 4% Lidocaine Cream   Using curette the wound(s)/ulcer(s) was/were debrided down through and including the removal of subcutaneous tissue. Devitalized Tissue Debrided:  fibrin, biofilm, slough and exudate  Pre Debridement Measurements:  Are located in the Wound/Ulcer Documentation Flow Sheet  Wound/Ulcer #: 1  Post Debridement Measurements:  Wound/Ulcer Descriptions are Pre Debridement except measurements: Total Surface Area Debrided:  0.5 sq cm   Diabetic/Pressure/Non Pressure Ulcers only:  Ulcer: Non-Pressure ulcer, muscle necrosis   Estimated Blood Loss:  Minimal  Hemostasis Achieved:  by pressure  Procedural Pain:  1  / 10   Post Procedural Pain:  0 / 10   Response to treatment:  Well tolerated by patient. HISTORY of PRESENT ILLNESS HPI     Benito Stewart is a 27 y.o. male who presents today for wound/ulcer evaluation. History of Wound Context: Per original history and physical on this patient. Changes in history since last exam:  None    Objective:    BP (!) 155/99   Pulse 95   Temp 97.3 °F (36.3 °C) (Infrared)   Resp 16   Wt Readings from Last 3 Encounters:   05/24/22 249 lb 5.4 oz (113.1 kg)   05/21/22 248 lb 10.9 oz (112.8 kg)       PHYSICAL EXAM  General: Alert and in no acute distress. Normal appearing  Skin: Warm and dry, no rash  Head: Normocephalic and atraumatic  Eyes: Extraocular eye movements intact, conjunctivae normal, and sclera anicteric  ENT: Hearing grossly normal bilaterally. Normal appearance  Respiratory: no chest wall tenderness.  no respiratory distress  GI: Abdomen non-tender and benign  Musculoskeletal: Baseline range of motion in joints. Nontender calves. No cyanosis. Edema trace. Neurologic: Speech normal. At baseline without new focal deficits. Mental status normal or at baseline    PAST MEDICAL HISTORY        Diagnosis Date    Open wound of scrotum with complication 3/00/3133    Scrotal abscess 2022       PAST SURGICAL HISTORY    Past Surgical History:   Procedure Laterality Date    SCROTAL SURGERY Right 2022    SCROTAL INCISION AND DRAINAGE - RIGHT performed by Elena Zelaya MD at [address] History   Problem Relation Age of Onset    Peripheral Vascular Disease Mother     Heart Attack Father        SOCIAL HISTORY    Social History     Tobacco Use    Smoking status: Current Every Day Smoker     Packs/day: 0.50    Smokeless tobacco: Never Used   Substance Use Topics    Alcohol use: Not Currently    Drug use: Never       ALLERGIES    No Known Allergies    MEDICATIONS    Current Outpatient Medications on File Prior to Encounter   Medication Sig Dispense Refill    sodium hypochlorite (DAKINS) 0.125 % SOLN external solution Apply topically 2 times daily 1000 mL 1    ibuprofen (ADVIL;MOTRIN) 200 MG tablet Take 400 mg by mouth every 6 hours as needed for Pain       No current facility-administered medications on file prior to encounter. REVIEW OF SYSTEMS  A comprehensive review of systems was negative except for what has been indicated above and: none    Discharge Instructions       [address] and [facility]   Physician Orders and Discharge Instructions  [address]   [address] [facility]  Telephone: [phone]      FAX [phone]     NAME: Annika Jacobson OF BIRTH:  1990  MEDICAL RECORD NUMBER:  [id number]  DATE:  2022     Wound Cleansing:   Do not scrub or use excessive force. Cleanse wound prior to applying a clean dressing with:  [x]? ?? Normal Saline  []? ?? Keep Wound Dry in Shower    []??? Wound Cleanser   []? ?? Cleanse wound with Mild Soap & Water  []??? May Shower at Discharge   []? ?? Other:      Topical Treatments:  Do not apply lotions, creams, or ointments to wound bed unless directed.   []??? Apply moisturizing lotion to skin surrounding the wound prior to dressing change.  []??? Apply antifungal ointment to skin surrounding the wound prior to dressing change.  []??? Apply thin film of moisture barrier ointment to skin immediately around wound. [x]??? Other:  ZINC OXIDE TO RED RASH TO GROINS OR MAY USE A 2% MICONAZOLE FUNGAL POWDER          **MAY USE GENTLE SOAP AND WATER GAUZE TO GENTLY CLEANSE WOUND PRIOR TO DRESSING CHANGES**                Dressings:                  Wound Location SCROTUM             [x]? ?? Apply Primary Dressing:                                          [x]? ?? ALGINATE WITH SILVER  []??? Pack wound loosely with    []? ?? Other   [x]? ?? Cover and Secure with:                   [x]? ?? GAUZE OR SANITARY NAPKINS- DO NOT NEED TO PACK INTO WOUND                                    []? ?? Other:                Avoid contact of tape with skin. [x]??? Change dressing:   [x]? ?? DAILY                      Pressure Relief:  [x]??? When sitting, shift position or do seat lifts every 15 minutes.  []??? Wheelchair cushion           []??? Specialty Bed/Mattress  []??? Turn every 2 hours when in bed.  Avoid position directing pressure on wound site.  Limit side lying to 30 degree tilt.  Limit HOB elevation to 30 degrees.        Dietary:  [x]??? Diet as tolerated:     []??? Calorie Diabetic Diet:           []??? No Added Salt:  [x]??? Increase Protein:     []??? Other:              Activity:  [x]??? Activity as tolerated:       If you are still having pain after you go home:  [x]??? Elevate the affected limb.    [x]? ?? Use over-the-counter medications you would normally use for pain as permitted by your family doctor.   [x]??? For persistent pain not relieved by the above interventions, please call your family doctor.             Return Appointment:  []??? Wound and dressing supply provider:   []??? ECF or Home Healthcare:   []??? Wound Assessment:          []? ?? Physician or NP scheduled for Wound Assessment:   [x]??? Return Appointment: With DR. Jayson Mackay MD  in  1 Week(s)  []??? Ordered tests:         Nurse Case Manger: [facility]    Electronically signed by Davina Lovelace RN on 7/5/2022 at 9:38 AM      [id number] [facility] [address] Information: Should you experience any significant changes in your wound(s) or have questions about your wound care, please contact the [address] [place] [address] [phone]  [address] [place] 8:00 am - 4:30 pm and Friday 8:00 am - 12:30 pm.  If you need help with your wound outside these hours and cannot wait until we are again available, contact your PCP or go to the hospital emergency room.      PLEASE NOTE: IF YOU ARE UNABLE TO OBTAIN WOUND SUPPLIES, CONTINUE TO USE THE SUPPLIES YOU HAVE AVAILABLE UNTIL YOU ARE ABLE TO [facility]. IT IS MOST IMPORTANT TO KEEP THE WOUND COVERED AT ALL TIMES.     Physician Signature:_______________________     Date: ___________ Time:  ____________                                [place].  Keiko Clark MD         Electronically signed by Sara Beebe MD on 7/5/2022 at 9:48 AM

## 2022-07-06 NOTE — ED TRIAGE NOTES
Has a cyst or abscess on his groin and states very painful and he has had some fevers, no fever today
23.1

## 2022-07-12 ENCOUNTER — HOSPITAL ENCOUNTER (OUTPATIENT)
Dept: WOUND CARE | Age: 32
Discharge: HOME | End: 2022-07-12
Payer: COMMERCIAL

## 2022-07-12 VITALS — SYSTOLIC BLOOD PRESSURE: 155 MMHG | RESPIRATION RATE: 18 BRPM | TEMPERATURE: 97.7 F | DIASTOLIC BLOOD PRESSURE: 100 MMHG

## 2022-07-12 DIAGNOSIS — S31.30XA OPEN WOUND OF SCROTUM WITH COMPLICATION, INITIAL ENCOUNTER: Primary | ICD-10-CM

## 2022-07-12 DIAGNOSIS — N49.2 SCROTAL ABSCESS: ICD-10-CM

## 2022-07-12 PROCEDURE — 99212 OFFICE O/P EST SF 10 MIN: CPT | Performed by: EMERGENCY MEDICINE

## 2022-07-12 PROCEDURE — 99212 OFFICE O/P EST SF 10 MIN: CPT

## 2022-07-12 RX ORDER — BACITRACIN, NEOMYCIN, POLYMYXIN B 400; 3.5; 5 [USP'U]/G; MG/G; [USP'U]/G
OINTMENT TOPICAL ONCE
Status: CANCELLED | OUTPATIENT
Start: 2022-07-12 | End: 2022-07-12

## 2022-07-12 RX ORDER — BACITRACIN ZINC AND POLYMYXIN B SULFATE 500; 1000 [USP'U]/G; [USP'U]/G
OINTMENT TOPICAL ONCE
Status: CANCELLED | OUTPATIENT
Start: 2022-07-12 | End: 2022-07-12

## 2022-07-12 RX ORDER — LIDOCAINE HYDROCHLORIDE 20 MG/ML
JELLY TOPICAL ONCE
Status: CANCELLED | OUTPATIENT
Start: 2022-07-12 | End: 2022-07-12

## 2022-07-12 RX ORDER — LIDOCAINE 40 MG/G
CREAM TOPICAL ONCE
Status: COMPLETED | OUTPATIENT
Start: 2022-07-12 | End: 2022-07-12

## 2022-07-12 RX ORDER — LIDOCAINE HYDROCHLORIDE 40 MG/ML
SOLUTION TOPICAL ONCE
Status: CANCELLED | OUTPATIENT
Start: 2022-07-12 | End: 2022-07-12

## 2022-07-12 RX ORDER — LIDOCAINE 50 MG/G
OINTMENT TOPICAL ONCE
Status: CANCELLED | OUTPATIENT
Start: 2022-07-12 | End: 2022-07-12

## 2022-07-12 RX ORDER — BETAMETHASONE DIPROPIONATE 0.05 %
OINTMENT (GRAM) TOPICAL ONCE
Status: CANCELLED | OUTPATIENT
Start: 2022-07-12 | End: 2022-07-12

## 2022-07-12 RX ORDER — CLOBETASOL PROPIONATE 0.5 MG/G
OINTMENT TOPICAL ONCE
Status: CANCELLED | OUTPATIENT
Start: 2022-07-12 | End: 2022-07-12

## 2022-07-12 RX ORDER — GINSENG 100 MG
CAPSULE ORAL ONCE
Status: CANCELLED | OUTPATIENT
Start: 2022-07-12 | End: 2022-07-12

## 2022-07-12 RX ORDER — LIDOCAINE 40 MG/G
CREAM TOPICAL ONCE
Status: CANCELLED | OUTPATIENT
Start: 2022-07-12 | End: 2022-07-12

## 2022-07-12 RX ORDER — GENTAMICIN SULFATE 1 MG/G
OINTMENT TOPICAL ONCE
Status: CANCELLED | OUTPATIENT
Start: 2022-07-12 | End: 2022-07-12

## 2022-07-12 RX ADMIN — LIDOCAINE 1 G: 40 CREAM TOPICAL at 09:30

## 2022-07-12 ASSESSMENT — PAIN SCALES - GENERAL: PAINLEVEL_OUTOF10: 0

## 2022-07-12 NOTE — PROGRESS NOTES
[address] [facility]   Medical Staff Progress Note     100 [facility] RECORD NUMBER:  [id number]  AGE: 27 y.o. GENDER: male  : 1990  EPISODE DATE:  2022    Chief complaint and reason for visit:     Chief Complaint   Patient presents with    Wound Check     follow up visit scrotum wound      Patient presenting for follow up evaluation of wound(s) per chief complaint. Subjective: Symptoms, wound related issues, or other pertinent wound history since last visit: no new issues. Medical Decision Making:     Problem List Items Addressed This Visit        Genitourinary    Scrotal abscess    Relevant Orders    Initiate Outpatient Wound Care Protocol    Open wound of scrotum with complication - Primary    Relevant Orders    Initiate Outpatient Wound Care Protocol        Wounds and Treatment Plan:   Right-sided scrotal wound, surgical.  Severity of muscle necrosis. Wound is healed. Scarred. Other associated diagnoses or problems addressed:   Impaired mobility. Specific instructions on movement and dressings/boxer briefs to keep dressings in place.  Cutaneous candidiasis. Significantly improved. Nearly all resolved. Continue Desitin zinc oxide. Diflucan completed    Pertinent imaging reviewed including independent interpretation include:   none    Pertinent labs reviewed. Medical records and review of external note (s) from other providers done as well. New lab or imaging orders placed:   none    Prescription drug management: N/A    Discussion of management or test interpretation with another provider, other qualified health care professional and other external source. Comorbid conditions affecting wound healing: As per PMH which was reviewed. Risk of complications and/or mortality of patient management:  1.  This patient has a moderate risk of morbidity and mortality from additional diagnostic testing or been indicated above and: none    Discharge Instructions       If you are still having pain after you go home:  [x]???? Elevate the affected limb.    [x]???? Use over-the-counter medications you would normally use for pain as permitted by your family doctor. [x]???? For persistent pain not relieved by the above interventions, please call your family doctor.             Return Appointment:  []???? Wound and dressing supply provider:   []???? ECF or Home Healthcare:   []???? Wound Assessment:          []???? Physician or NP scheduled for Wound Assessment:   [x]???? Return Appointment: With DR. Emerita Barrientos MD  in  1 Week(s)  []???? Ordered tests:         Nurse Case Manger: [facility] Information: Should you experience any significant changes in your wound(s) or have questions about your wound care, please contact the [address] [place] [address] [phone] [address] [place], [place] [address] [place] 8:00 am - 4:30 pm and Friday 8:00 am - 12:30 pm.  If you need help with your wound outside these hours and cannot wait until we are again available, contact your PCP or go to the hospital emergency room.      PLEASE NOTE: IF YOU ARE UNABLE TO OBTAIN WOUND SUPPLIES, CONTINUE TO USE THE SUPPLIES YOU HAVE AVAILABLE UNTIL YOU ARE ABLE TO [facility]. IT IS MOST IMPORTANT TO KEEP THE WOUND COVERED AT ALL TIMES.     Physician Signature:_______________________     Date: ___________ Time:  ____________                                DC.  Dave Call MD      Electronically signed by Elizabeth Wright MD on 7/12/2022 at 9:48 AM
